# Patient Record
Sex: FEMALE | Race: WHITE | NOT HISPANIC OR LATINO | Employment: FULL TIME | URBAN - METROPOLITAN AREA
[De-identification: names, ages, dates, MRNs, and addresses within clinical notes are randomized per-mention and may not be internally consistent; named-entity substitution may affect disease eponyms.]

---

## 2018-08-22 ENCOUNTER — OFFICE VISIT (OUTPATIENT)
Dept: OBSTETRICS AND GYNECOLOGY | Facility: CLINIC | Age: 48
End: 2018-08-22
Payer: COMMERCIAL

## 2018-08-22 VITALS
WEIGHT: 187.19 LBS | DIASTOLIC BLOOD PRESSURE: 88 MMHG | HEIGHT: 69 IN | BODY MASS INDEX: 27.73 KG/M2 | SYSTOLIC BLOOD PRESSURE: 136 MMHG

## 2018-08-22 DIAGNOSIS — Z00.00 PERIODIC HEALTH ASSESSMENT, GENERAL SCREENING, ADULT: ICD-10-CM

## 2018-08-22 DIAGNOSIS — Z78.0 MENOPAUSE: Primary | ICD-10-CM

## 2018-08-22 PROCEDURE — 99204 OFFICE O/P NEW MOD 45 MIN: CPT | Mod: S$GLB,,, | Performed by: OBSTETRICS & GYNECOLOGY

## 2018-08-22 PROCEDURE — 3008F BODY MASS INDEX DOCD: CPT | Mod: CPTII,S$GLB,, | Performed by: OBSTETRICS & GYNECOLOGY

## 2018-08-22 PROCEDURE — 99999 PR PBB SHADOW E&M-NEW PATIENT-LVL III: CPT | Mod: PBBFAC,,, | Performed by: OBSTETRICS & GYNECOLOGY

## 2018-08-22 RX ORDER — OMEPRAZOLE/SODIUM BICARBONATE 20MG-1.1G
CAPSULE ORAL
Refills: 0 | COMMUNITY
Start: 2018-07-17 | End: 2018-08-22

## 2018-08-22 RX ORDER — LIDOCAINE, MENTHOL 600; 600 MG/1; MG/1
PATCH TOPICAL
Refills: 3 | COMMUNITY
Start: 2018-06-11 | End: 2019-09-12

## 2018-08-22 RX ORDER — TOPIRAMATE 25 MG/1
TABLET ORAL
COMMUNITY
Start: 2018-06-25 | End: 2018-11-05

## 2018-08-22 RX ORDER — DULOXETIN HYDROCHLORIDE 60 MG/1
CAPSULE, DELAYED RELEASE ORAL
COMMUNITY
Start: 2018-08-14 | End: 2018-11-05

## 2018-08-22 RX ORDER — DICLOFENAC SODIUM/MENTHOL 1.5 %-10 %
KIT TOPICAL
Refills: 3 | COMMUNITY
Start: 2018-06-11 | End: 2018-11-05

## 2018-08-22 RX ORDER — LITHIUM CARBONATE 300 MG/1
CAPSULE ORAL
COMMUNITY
Start: 2018-08-14 | End: 2018-08-22

## 2018-08-22 RX ORDER — ESTRADIOL 1 MG/1
1 TABLET ORAL DAILY
Qty: 30 TABLET | Refills: 11 | Status: SHIPPED | OUTPATIENT
Start: 2018-08-22 | End: 2018-11-05

## 2018-08-22 RX ORDER — FENORTHO 200 MG/1
CAPSULE ORAL
Refills: 3 | COMMUNITY
Start: 2018-06-11 | End: 2018-08-22

## 2018-08-22 RX ORDER — DICLOFENAC SODIUM 16.05 MG/ML
SOLUTION TOPICAL
Refills: 0 | COMMUNITY
Start: 2018-07-17 | End: 2018-11-05

## 2018-08-22 RX ORDER — DULOXETIN HYDROCHLORIDE 30 MG/1
CAPSULE, DELAYED RELEASE ORAL
COMMUNITY
Start: 2018-06-25 | End: 2018-11-05

## 2018-08-22 RX ORDER — LIDOCAINE HYDROCHLORIDE 38.8 MG/G
CREAM TOPICAL
Refills: 0 | COMMUNITY
Start: 2018-07-17 | End: 2018-08-22

## 2018-08-22 RX ORDER — TOPIRAMATE 50 MG/1
TABLET, FILM COATED ORAL
COMMUNITY
Start: 2018-08-14 | End: 2019-06-05

## 2018-08-22 RX ORDER — FENOPROFEN CALCIUM 400 MG/1
CAPSULE ORAL
Refills: 0 | COMMUNITY
Start: 2018-07-17 | End: 2018-08-22

## 2018-08-22 RX ORDER — GEL-MATRIX PAD DRESS, SILICONE 2"X5.5"
PAD TOPICAL
Refills: 0 | COMMUNITY
Start: 2018-08-09 | End: 2021-02-11

## 2018-08-22 RX ORDER — DEXTROAMPHETAMINE SACCHARATE, AMPHETAMINE ASPARTATE MONOHYDRATE, DEXTROAMPHETAMINE SULFATE AND AMPHETAMINE SULFATE 5; 5; 5; 5 MG/1; MG/1; MG/1; MG/1
20 CAPSULE, EXTENDED RELEASE ORAL DAILY
COMMUNITY
Start: 2018-08-18 | End: 2021-02-11

## 2018-08-22 RX ORDER — PROGESTERONE 100 MG/1
100 CAPSULE ORAL DAILY
Qty: 30 CAPSULE | Refills: 11 | Status: SHIPPED | OUTPATIENT
Start: 2018-08-22 | End: 2018-11-05

## 2018-08-22 RX ORDER — CAMPHOR, LIDOCAINE, AND METHYL SALICYLATE 2.5; 2; 4 MG/100MG; MG/100MG; MG/100MG
PATCH TOPICAL
Refills: 0 | COMMUNITY
Start: 2018-07-17 | End: 2018-08-22

## 2018-08-22 NOTE — PROGRESS NOTES
Subjective:      Herlinda Ge is a 48 y.o. female who presents to discuss hormone replacement therapy.  Menarche occurred at age 10 and the patient went into menopause at 46 years of age, which was 2 years ago. Patient is requesting hormone replacement therapy due to hot flashes, fatigue, anxiety, depression, weight gain, and urinary incontinence. The patient has never been taking hormone replacement therapy. Patient denies post-menopausal vaginal bleeding. The patient is sexually active.  She denies the following contraindications to HRT:  Vaginal bleeding, history of VTE/PE, thrombophilia,  breast cancer, or active liver disease.       Pap smear: 2018 Normal per pt  Mammogram: 2018 Normal per pt  DEXA: 18    No visits with results within 3 Month(s) from this visit.   Latest known visit with results is:   Office Visit on 2015   Component Date Value Ref Range Status    Blood Stool 2015 Negative  Negative Final     Acceptable 2015 Yes   Final    Clinical Information: 2015 NONE GIVEN   Final    Last Menstrual Period 2015 NONE GIVEN   Final    Previous PAP 2015 NONE GIVEN   Final    Previous Bx 2015 NONE GIVEN   Final    Source: 2015 Cervix   Final    Statement of Adequacy: 2015    Final    Interpretation/Result: 2015    Final    Comment: 2015    Final    Cytotechnologist: 2015    Final       Past Medical History:   Diagnosis Date    ADHD     Fracture, vertebral, lumbar closed     History of anxiety     History of depression     History of eating disorder      Past Surgical History:   Procedure Laterality Date     SECTION      KNEE ARTHROSCOPY      TENDON REPAIR      ankle     TONSILLECTOMY       Social History     Tobacco Use    Smoking status: Never Smoker    Smokeless tobacco: Never Used   Substance Use Topics    Alcohol use: Yes     Alcohol/week: 0.0 oz     Comment: rare    Drug  "use: No     Family History   Problem Relation Age of Onset    Heart attack Father     Breast cancer Mother     Colon cancer Neg Hx     Ovarian cancer Neg Hx     Diabetes Neg Hx     Hypertension Neg Hx     Stroke Neg Hx      OB History    Para Term  AB Living   2 1 1   1 1   SAB TAB Ectopic Multiple Live Births           1      # Outcome Date GA Lbr Florentino/2nd Weight Sex Delivery Anes PTL Lv   2 Term 01 40w0d  4.366 kg (9 lb 10 oz) M CS-Unspec   GELA      Complications: GDM (gestational diabetes mellitus)   1 AB               Obstetric Comments   Age at menarche 10       Current Outpatient Medications:     AMPHETAMINE SALT COMBO 10 MG tablet, , Disp: , Rfl: 0    dextroamphetamine-amphetamine (ADDERALL XR) 20 MG 24 hr capsule, , Disp: , Rfl:     DULoxetine (CYMBALTA) 30 MG capsule, , Disp: , Rfl:     DULoxetine (CYMBALTA) 60 MG capsule, , Disp: , Rfl:     FOLIKA-D 3,775 unit- 1 mg Tab, Take 1 tablet by mouth once daily., Disp: , Rfl: 3    SILIVEX 2 X 5.5 " Pads, APPLY 1 PAD TO SCAR FOR 8-12 HOURS DAILY. REUSE SAME PAD DAILY AND REPLACE PAD EVERY 7 DAYS AS DIRECTED, Disp: , Rfl: 0    TEROCIN, LIDOCAINE-MENTHOL, 4-4 % PtMd, APPLY UP TO 3 PATCHES TO THE AFFECTED AREA TWICE DAILY, Disp: , Rfl: 3    topiramate (TOPAMAX) 25 MG tablet, , Disp: , Rfl:     topiramate (TOPAMAX) 50 MG tablet, , Disp: , Rfl:     diclofenac sodium 1.5 % Drop, APPLY 40 DROPS TO AFFECTED AREA(S) FOUR TIMES DAILY, Disp: , Rfl: 0    DITHOL 1.5-10 % Cmpk, APPLY 40 DROPS TO AFFECTED AREA (S) FOUR TIMES DAILY. APPLY MENTHOL SOLUTION FOR BREAKTHROUGH PAIN, Disp: , Rfl: 3    estradiol (ESTRACE) 1 MG tablet, Take 1 tablet (1 mg total) by mouth once daily. Every morning, Disp: 30 tablet, Rfl: 11    progesterone (PROMETRIUM) 100 MG capsule, Take 1 capsule (100 mg total) by mouth once daily. 30-60 minutes before bed, Disp: 30 capsule, Rfl: 11    Review of Systems:  General: No fever, chills, or weight loss.  Chest: No chest " pain, shortness of breath, or palpitations.  Breast: No pain, masses, or nipple discharge.  Vulva: No pain, lesions, or itching.  Vagina: No relaxation, itching, discharge, or lesions.  Abdomen: No pain, nausea, vomiting, diarrhea, or constipation.  Urinary: No incontinence, nocturia, frequency, or dysuria.  Extremities:  No leg cramps, edema, or calf pain.  Neurologic: No headaches, dizziness, or visual changes.     Assessment:    Menopause  -     DHEA-sulfate; Future; Expected date: 08/22/2018  -     Estradiol; Future; Expected date: 08/22/2018  -     Progesterone; Future; Expected date: 08/22/2018  -     Testosterone; Future; Expected date: 08/22/2018  -     Testosterone, free; Future; Expected date: 08/22/2018  -     Vitamin B12; Future; Expected date: 08/22/2018  -     Vitamin D; Future; Expected date: 08/22/2018  -     estradiol (ESTRACE) 1 MG tablet; Take 1 tablet (1 mg total) by mouth once daily. Every morning  Dispense: 30 tablet; Refill: 11  -     progesterone (PROMETRIUM) 100 MG capsule; Take 1 capsule (100 mg total) by mouth once daily. 30-60 minutes before bed  Dispense: 30 capsule; Refill: 11    Periodic health assessment, general screening, adult  -     CBC auto differential; Future  -     Comprehensive metabolic panel; Future; Expected date: 08/22/2018  -     Hemoglobin A1c; Future; Expected date: 08/22/2018  -     TSH; Future; Expected date: 08/22/2018  -     Lipid panel; Future; Expected date: 08/22/2018  -     T4, free; Future; Expected date: 08/22/2018  -     T3, free; Future; Expected date: 08/22/2018        Plan:   Risks and benefits of hormone replacement therapy were discussed.  Hormone replacement therapy options, including bioidentical versus non-bioidentical hormones, as well as alternatives discussed.  Start:   Estradiol 1 mg orally QPM.   Progesterone 100 mg orally QPM   Testosterone 40 mg/gm cream @ 1/4 gm in vanishing base.  Apply to labia minora QHS.  Wash hands thoroughly after  applying  and avoid skin to skin contact for 4 hours after application to avoid transfer.  FDA black box warning for MI, stroke, and DVT  reviewed.   Osteo Additional Support 3 capsules daily   Velasco Ipriflavone Complex with Ostivone 2 tablets BID  Follow up in 2 months.   Will recheck estradiol, progesterone, and free and total testosterone in 1 month.  Instructed patient to call if she experiences any side effects or has any questions.  45 minutes, >50% counseling.

## 2018-09-12 ENCOUNTER — TELEPHONE (OUTPATIENT)
Dept: OBSTETRICS AND GYNECOLOGY | Facility: CLINIC | Age: 48
End: 2018-09-12

## 2018-09-12 NOTE — TELEPHONE ENCOUNTER
MD Sophie Dodge RN   Caller: Unspecified (Today,  9:31 AM)             Irregular bleeding could last up to 6 months,, but probably not continuously.  If she is feeling dizzy, she needs to call      Pt advised.

## 2018-09-12 NOTE — TELEPHONE ENCOUNTER
Swing pt, recently started hormones. Pt hasn't had period in 18 months but is now bleeding like a regular period.

## 2018-09-12 NOTE — TELEPHONE ENCOUNTER
Pt hasn't had a period in 18 months but started bleeding like a period.  She has been on Estrace and Prometrium for 3 weeks.  She hasn't missed any pills.  Reassured her she may have abnormal bleeding in the first 6 months.      She is asking when it should stop?

## 2018-11-05 ENCOUNTER — CLINICAL SUPPORT (OUTPATIENT)
Dept: OBSTETRICS AND GYNECOLOGY | Facility: CLINIC | Age: 48
End: 2018-11-05
Payer: COMMERCIAL

## 2018-11-05 ENCOUNTER — OFFICE VISIT (OUTPATIENT)
Dept: OBSTETRICS AND GYNECOLOGY | Facility: CLINIC | Age: 48
End: 2018-11-05
Attending: OBSTETRICS & GYNECOLOGY
Payer: COMMERCIAL

## 2018-11-05 VITALS
SYSTOLIC BLOOD PRESSURE: 126 MMHG | HEIGHT: 69 IN | BODY MASS INDEX: 26.76 KG/M2 | DIASTOLIC BLOOD PRESSURE: 82 MMHG | WEIGHT: 180.69 LBS

## 2018-11-05 DIAGNOSIS — Z78.0 MENOPAUSE: Primary | ICD-10-CM

## 2018-11-05 DIAGNOSIS — R45.89 MOODINESS: ICD-10-CM

## 2018-11-05 DIAGNOSIS — N89.8 VAGINAL DISCHARGE: ICD-10-CM

## 2018-11-05 DIAGNOSIS — R53.83 FATIGUE, UNSPECIFIED TYPE: ICD-10-CM

## 2018-11-05 DIAGNOSIS — R63.5 WEIGHT GAIN: ICD-10-CM

## 2018-11-05 LAB
CANDIDA RRNA VAG QL PROBE: POSITIVE
G VAGINALIS RRNA GENITAL QL PROBE: POSITIVE
T VAGINALIS RRNA GENITAL QL PROBE: NEGATIVE

## 2018-11-05 PROCEDURE — 96372 THER/PROPH/DIAG INJ SC/IM: CPT | Mod: S$GLB,,, | Performed by: OBSTETRICS & GYNECOLOGY

## 2018-11-05 PROCEDURE — 99214 OFFICE O/P EST MOD 30 MIN: CPT | Mod: 25,S$GLB,, | Performed by: OBSTETRICS & GYNECOLOGY

## 2018-11-05 PROCEDURE — 99999 PR PBB SHADOW E&M-EST. PATIENT-LVL III: CPT | Mod: PBBFAC,,, | Performed by: OBSTETRICS & GYNECOLOGY

## 2018-11-05 PROCEDURE — 87660 TRICHOMONAS VAGIN DIR PROBE: CPT

## 2018-11-05 PROCEDURE — 99999 PR PBB SHADOW E&M-EST. PATIENT-LVL III: CPT | Mod: PBBFAC,,,

## 2018-11-05 PROCEDURE — 3008F BODY MASS INDEX DOCD: CPT | Mod: CPTII,S$GLB,, | Performed by: OBSTETRICS & GYNECOLOGY

## 2018-11-05 RX ORDER — LIDOCAINE HYDROCHLORIDE 38.8 MG/G
CREAM TOPICAL
Refills: 5 | COMMUNITY
Start: 2018-09-17 | End: 2018-11-05

## 2018-11-05 RX ORDER — LIDOCAINE 2.5% AND PRILOCAINE 2.5% 2.5 %-2.5%
KIT TOPICAL
Refills: 0 | COMMUNITY
Start: 2018-10-30 | End: 2018-11-05

## 2018-11-05 RX ORDER — LIDOCAINE HCL/PALM OIL
SPRAY, NON-AEROSOL (ML) TOPICAL
Refills: 0 | COMMUNITY
Start: 2018-10-30 | End: 2018-11-05

## 2018-11-05 RX ORDER — ESTRADIOL 2 MG/1
2 TABLET ORAL DAILY
Qty: 30 TABLET | Refills: 11 | Status: SHIPPED | OUTPATIENT
Start: 2018-11-05 | End: 2018-11-16 | Stop reason: SDUPTHER

## 2018-11-05 RX ORDER — CAMPHOR, LIDOCAINE, AND METHYL SALICYLATE 2.5; 2; 4 MG/100MG; MG/100MG; MG/100MG
PATCH TOPICAL
Refills: 5 | COMMUNITY
Start: 2018-09-17 | End: 2019-06-05

## 2018-11-05 RX ORDER — PROGESTERONE 200 MG/1
200 CAPSULE ORAL DAILY
Qty: 30 CAPSULE | Refills: 11 | Status: SHIPPED | OUTPATIENT
Start: 2018-11-05 | End: 2018-11-16 | Stop reason: SDUPTHER

## 2018-11-05 RX ORDER — TESTOSTERONE CYPIONATE 200 MG/ML
75 INJECTION, SOLUTION INTRAMUSCULAR
Status: COMPLETED | OUTPATIENT
Start: 2018-11-05 | End: 2019-01-02

## 2018-11-05 RX ADMIN — TESTOSTERONE CYPIONATE 76 MG: 200 INJECTION, SOLUTION INTRAMUSCULAR at 01:11

## 2018-11-05 NOTE — PROGRESS NOTES
Subjective:      Herlinda Ge is a 48 y.o. female who is here for follow-up of hormone replacement therapy.  At her last visit on 18, she had the following concerns:  hot flashes, fatigue, anxiety, depression, and weight gain.  We started her on estradiol 1 mg in the morning, progesterone 100 mg in the evening, testosterone cream 10 mg QHS, Velasco Ipriflavone Complex 2 tablets twice daily, and Osteo Additional Support 3 capsules daily.  The patient states the following symptoms have improved: hot flashes.  Her main concern today is inability to lose weight.  She used to workout regularly, but doesn't now.  She says she walks a lot at work. She also complains of a vaginal odor and discharge.  She had the following side effects: none except maybe a little hair on her chin.  She also had a little spotting when she first started the hormones.  She often falls asleep before applying the testosterone cream.  The patient is sexually active.     No visits with results within 3 Month(s) from this visit.   Latest known visit with results is:   Office Visit on 2015   Component Date Value Ref Range Status    Blood Stool 2015 Negative  Negative Final     Acceptable 2015 Yes   Final    Clinical Information: 2015 NONE GIVEN   Final    Last Menstrual Period 2015 NONE GIVEN   Final    Previous PAP 2015 NONE GIVEN   Final    Previous Bx 2015 NONE GIVEN   Final    Source: 2015 Cervix   Final    Statement of Adequacy: 2015    Final    Interpretation/Result: 2015    Final    Comment: 2015    Final    Cytotechnologist: 2015    Final       Past Medical History:   Diagnosis Date    ADHD     Fracture, vertebral, lumbar closed     History of anxiety     History of depression     History of eating disorder      Past Surgical History:   Procedure Laterality Date     SECTION      KNEE ARTHROSCOPY      TENDON REPAIR    "   ankle     TONSILLECTOMY       Social History     Tobacco Use    Smoking status: Never Smoker    Smokeless tobacco: Never Used   Substance Use Topics    Alcohol use: Yes     Alcohol/week: 0.0 oz     Comment: rare    Drug use: No     Family History   Problem Relation Age of Onset    Heart attack Father     Breast cancer Mother     Colon cancer Neg Hx     Ovarian cancer Neg Hx     Diabetes Neg Hx     Hypertension Neg Hx     Stroke Neg Hx      OB History    Para Term  AB Living   2 1 1   1 1   SAB TAB Ectopic Multiple Live Births           1      # Outcome Date GA Lbr Florentino/2nd Weight Sex Delivery Anes PTL Lv   2 Term 01 40w0d  4.366 kg (9 lb 10 oz) M CS-Unspec   GELA      Complications: GDM (gestational diabetes mellitus)   1 AB               Obstetric Comments   Age at menarche 10       Current Outpatient Medications:     AMPHETAMINE SALT COMBO 10 MG tablet, , Disp: , Rfl: 0    dextroamphetamine-amphetamine (ADDERALL XR) 20 MG 24 hr capsule, , Disp: , Rfl:     FOLIKA-D 3,775 unit- 1 mg Tab, Take 1 tablet by mouth once daily., Disp: , Rfl: 3    SILIVEX 2 X 5.5 " Pads, APPLY 1 PAD TO SCAR FOR 8-12 HOURS DAILY. REUSE SAME PAD DAILY AND REPLACE PAD EVERY 7 DAYS AS DIRECTED, Disp: , Rfl: 0    TEROCIN, LIDOCAINE-MENTHOL, 4-4 % PtMd, APPLY UP TO 3 PATCHES TO THE AFFECTED AREA TWICE DAILY, Disp: , Rfl: 3    topiramate (TOPAMAX) 50 MG tablet, , Disp: , Rfl:     VIVA PATCH 2.5-4-2 % PtMd, APPLY UP TO 3 PATCHES TO THE AFFECTED AREA TWICE DAILY, Disp: , Rfl: 5    estradiol (ESTRACE) 2 MG tablet, Take 1 tablet (2 mg total) by mouth once daily., Disp: 30 tablet, Rfl: 11    progesterone (PROMETRIUM) 200 MG capsule, Take 1 capsule (200 mg total) by mouth once daily., Disp: 30 capsule, Rfl: 11    Current Facility-Administered Medications:     testosterone cypionate injection 76 mg, 76 mg, Intramuscular, Q28 Days, Gisselle Magdaleno MD    Review of Systems:  General: No fever, chills, or weight " loss.  Chest: No chest pain, shortness of breath, or palpitations.  Breast: No pain, masses, or nipple discharge.  Vulva: No pain, lesions, or itching.  Vagina: No relaxation, itching, discharge, or lesions.  Abdomen: No pain, nausea, vomiting, diarrhea, or constipation.  Urinary: No incontinence, nocturia, frequency, or dysuria.  Extremities:  No leg cramps, edema, or calf pain.  Neurologic: No headaches, dizziness, or visual changes.    Physical Exam:  General:  Well developed, well nourished, and in no apparent distress.  HEENT:  Normal sclera.  No thyromegaly  External genitalia:  No lesions, erythema, rashes, or other abnormalities.  Urethra:  No lesions or discharge.  Vagina:  No lesions or tenderness. Minimal white discharge  Cervix:  No lesions, discharge, or cervical motion tenderness.   Uterus:  Normal in size and shape.  Mobile and nontender.   Adnexa:  No masses or tenderness.       Assessment:    Menopause  -     testosterone cypionate injection 76 mg  -     progesterone (PROMETRIUM) 200 MG capsule; Take 1 capsule (200 mg total) by mouth once daily.  Dispense: 30 capsule; Refill: 11  -     estradiol (ESTRACE) 2 MG tablet; Take 1 tablet (2 mg total) by mouth once daily.  Dispense: 30 tablet; Refill: 11    Weight gain    Fatigue, unspecified type    Moodiness    Vaginal discharge  -     Vaginosis Screen by DNA Probe        Plan:   Risks and benefits of hormone replacement therapy were discussed.  Hormone replacement therapy options, including bioidentical versus non-bioidentical hormones, as well as alternatives discussed.  Increase:  Estradiol from 1 to 2 mg orally QPM.  Progesterone 100 to 200 mg orally QPM  Change:  Testosterone cream to Testosterone cypionate 76 mg IM monthly.  Patient is aware this is off-label use in women, and FDA black box warnings were reviewed.  Recommend [place] Diet or Mediterranean Diet.  I recommend 30 minutes 5 days a week of moderate intensity aerobics (fast walking,  treadmill, biking, etc.) and weight-bearing exercise (free weights, machines, tension bands, etc.) at least twice weekly for overall health benefits and the prevention and/or treatment of osteoporosis.  Follow up in 5 months for annual exam.   Will recheck testosterone levels when she is feeling something or if side effects.  Instructed patient to call if she experiences any side effects or has any questions.

## 2018-11-05 NOTE — PROGRESS NOTES
Here for  hormone therapy injection, no complaints at this time , Injection given as ordered, tolerated well, no report of pain prior to or after injection. Return to clinic as scheduled.     Site - [facility]    Testosterone 76 mg    Clinic Supplied Medication

## 2018-11-06 DIAGNOSIS — B37.31 CANDIDIASIS OF VAGINA: ICD-10-CM

## 2018-11-06 DIAGNOSIS — N76.0 BV (BACTERIAL VAGINOSIS): Primary | ICD-10-CM

## 2018-11-06 DIAGNOSIS — B96.89 BV (BACTERIAL VAGINOSIS): Primary | ICD-10-CM

## 2018-11-06 RX ORDER — FLUCONAZOLE 150 MG/1
TABLET ORAL
Qty: 2 TABLET | Refills: 1 | Status: SHIPPED | OUTPATIENT
Start: 2018-11-06 | End: 2019-06-05

## 2018-11-06 RX ORDER — TINIDAZOLE 500 MG/1
2 TABLET ORAL
Qty: 8 TABLET | Refills: 2 | Status: SHIPPED | OUTPATIENT
Start: 2018-11-06 | End: 2018-11-08

## 2018-11-16 ENCOUNTER — TELEPHONE (OUTPATIENT)
Dept: OBSTETRICS AND GYNECOLOGY | Facility: CLINIC | Age: 48
End: 2018-11-16

## 2018-11-16 DIAGNOSIS — Z78.0 MENOPAUSE: ICD-10-CM

## 2018-11-16 RX ORDER — ESTRADIOL 2 MG/1
2 TABLET ORAL DAILY
Qty: 3 TABLET | Refills: 0 | Status: SHIPPED | OUTPATIENT
Start: 2018-11-16 | End: 2018-11-19

## 2018-11-16 RX ORDER — PROGESTERONE 200 MG/1
200 CAPSULE ORAL DAILY
Qty: 3 CAPSULE | Refills: 0 | Status: SHIPPED | OUTPATIENT
Start: 2018-11-16 | End: 2018-11-19

## 2018-11-16 NOTE — TELEPHONE ENCOUNTER
She forgot her hormones at home going to [place] for the weekend.    Pended 3 pills of Estrace and progesterone to pharmacy out of town.      Thank you

## 2018-11-24 DIAGNOSIS — Z78.0 MENOPAUSE: ICD-10-CM

## 2018-11-27 RX ORDER — PROGESTERONE 200 MG/1
CAPSULE ORAL
Qty: 3 CAPSULE | OUTPATIENT
Start: 2018-11-27

## 2018-11-27 RX ORDER — PROGESTERONE 200 MG/1
200 CAPSULE ORAL NIGHTLY
Qty: 30 CAPSULE | Refills: 11 | Status: SHIPPED | OUTPATIENT
Start: 2018-11-27 | End: 2019-06-05

## 2018-11-27 RX ORDER — ESTRADIOL 2 MG/1
2 TABLET ORAL DAILY
Qty: 30 TABLET | Refills: 11 | Status: SHIPPED | OUTPATIENT
Start: 2018-11-27 | End: 2019-06-05

## 2018-11-27 RX ORDER — ESTRADIOL 2 MG/1
TABLET ORAL
Qty: 3 TABLET | OUTPATIENT
Start: 2018-11-27

## 2018-12-03 ENCOUNTER — TELEPHONE (OUTPATIENT)
Dept: OBSTETRICS AND GYNECOLOGY | Facility: CLINIC | Age: 48
End: 2018-12-03

## 2018-12-03 RX ORDER — FLUCONAZOLE 150 MG/1
150 TABLET ORAL ONCE
Qty: 2 TABLET | Refills: 0 | Status: SHIPPED | OUTPATIENT
Start: 2018-12-03 | End: 2018-12-03

## 2018-12-03 NOTE — TELEPHONE ENCOUNTER
Dr Magdaleno pt calling, pt has a yeast infection and would like medication Diflucan sent in. Pt # [phone] [facility] # [phone]

## 2018-12-03 NOTE — TELEPHONE ENCOUNTER
Pt notified rx has been sent to pharmacy.  Advised if no improvement after taking medication she should be seen.

## 2018-12-05 ENCOUNTER — CLINICAL SUPPORT (OUTPATIENT)
Dept: OBSTETRICS AND GYNECOLOGY | Facility: CLINIC | Age: 48
End: 2018-12-05
Payer: COMMERCIAL

## 2018-12-05 PROCEDURE — 96372 THER/PROPH/DIAG INJ SC/IM: CPT | Mod: S$GLB,,, | Performed by: OBSTETRICS & GYNECOLOGY

## 2018-12-05 PROCEDURE — 99999 PR PBB SHADOW E&M-EST. PATIENT-LVL II: CPT | Mod: PBBFAC,,,

## 2018-12-05 RX ADMIN — TESTOSTERONE CYPIONATE 76 MG: 200 INJECTION, SOLUTION INTRAMUSCULAR at 08:12

## 2018-12-05 NOTE — PROGRESS NOTES
Here for  hormone therapy injection, no complaints at this time , Injection given as ordered, tolerated well, no report of pain prior to or after injection. Return to clinic as scheduled.     Site - [facility]    Testosterone  76  mg    Clinic Supplied Medication

## 2019-01-02 ENCOUNTER — CLINICAL SUPPORT (OUTPATIENT)
Dept: OBSTETRICS AND GYNECOLOGY | Facility: CLINIC | Age: 49
End: 2019-01-02
Payer: COMMERCIAL

## 2019-01-02 PROCEDURE — 96372 THER/PROPH/DIAG INJ SC/IM: CPT | Mod: S$GLB,,, | Performed by: OBSTETRICS & GYNECOLOGY

## 2019-01-02 PROCEDURE — 96372 PR INJECTION,THERAP/PROPH/DIAG2ST, IM OR SUBCUT: ICD-10-PCS | Mod: S$GLB,,, | Performed by: OBSTETRICS & GYNECOLOGY

## 2019-01-02 PROCEDURE — 99999 PR PBB SHADOW E&M-EST. PATIENT-LVL II: ICD-10-PCS | Mod: PBBFAC,,,

## 2019-01-02 PROCEDURE — 99999 PR PBB SHADOW E&M-EST. PATIENT-LVL II: CPT | Mod: PBBFAC,,,

## 2019-01-02 RX ADMIN — TESTOSTERONE CYPIONATE 76 MG: 200 INJECTION, SOLUTION INTRAMUSCULAR at 08:01

## 2019-01-30 ENCOUNTER — CLINICAL SUPPORT (OUTPATIENT)
Dept: OBSTETRICS AND GYNECOLOGY | Facility: CLINIC | Age: 49
End: 2019-01-30
Payer: COMMERCIAL

## 2019-01-30 DIAGNOSIS — Z79.890 HORMONE REPLACEMENT THERAPY (HRT): Primary | ICD-10-CM

## 2019-01-30 PROCEDURE — 96372 PR INJECTION,THERAP/PROPH/DIAG2ST, IM OR SUBCUT: ICD-10-PCS | Mod: S$GLB,,, | Performed by: OBSTETRICS & GYNECOLOGY

## 2019-01-30 PROCEDURE — 99999 PR PBB SHADOW E&M-EST. PATIENT-LVL II: CPT | Mod: PBBFAC,,,

## 2019-01-30 PROCEDURE — 99999 PR PBB SHADOW E&M-EST. PATIENT-LVL II: ICD-10-PCS | Mod: PBBFAC,,,

## 2019-01-30 PROCEDURE — 96372 THER/PROPH/DIAG INJ SC/IM: CPT | Mod: S$GLB,,, | Performed by: OBSTETRICS & GYNECOLOGY

## 2019-01-30 RX ORDER — TESTOSTERONE CYPIONATE 200 MG/ML
76 INJECTION, SOLUTION INTRAMUSCULAR
Status: COMPLETED | OUTPATIENT
Start: 2019-01-30 | End: 2019-02-27

## 2019-01-30 RX ADMIN — TESTOSTERONE CYPIONATE 76 MG: 200 INJECTION, SOLUTION INTRAMUSCULAR at 08:01

## 2019-02-27 ENCOUNTER — CLINICAL SUPPORT (OUTPATIENT)
Dept: OBSTETRICS AND GYNECOLOGY | Facility: CLINIC | Age: 49
End: 2019-02-27
Payer: COMMERCIAL

## 2019-02-27 PROCEDURE — 99999 PR PBB SHADOW E&M-EST. PATIENT-LVL II: CPT | Mod: PBBFAC,,,

## 2019-02-27 PROCEDURE — 96372 PR INJECTION,THERAP/PROPH/DIAG2ST, IM OR SUBCUT: ICD-10-PCS | Mod: S$GLB,,, | Performed by: OBSTETRICS & GYNECOLOGY

## 2019-02-27 PROCEDURE — 96372 THER/PROPH/DIAG INJ SC/IM: CPT | Mod: S$GLB,,, | Performed by: OBSTETRICS & GYNECOLOGY

## 2019-02-27 PROCEDURE — 99999 PR PBB SHADOW E&M-EST. PATIENT-LVL II: ICD-10-PCS | Mod: PBBFAC,,,

## 2019-02-27 RX ADMIN — TESTOSTERONE CYPIONATE 76 MG: 200 INJECTION, SOLUTION INTRAMUSCULAR at 08:02

## 2019-03-27 ENCOUNTER — CLINICAL SUPPORT (OUTPATIENT)
Dept: OBSTETRICS AND GYNECOLOGY | Facility: CLINIC | Age: 49
End: 2019-03-27
Payer: COMMERCIAL

## 2019-03-27 DIAGNOSIS — Z79.890 HORMONE REPLACEMENT THERAPY (HRT): Primary | ICD-10-CM

## 2019-03-27 PROCEDURE — 96372 THER/PROPH/DIAG INJ SC/IM: CPT | Mod: S$GLB,,, | Performed by: OBSTETRICS & GYNECOLOGY

## 2019-03-27 PROCEDURE — 99999 PR PBB SHADOW E&M-EST. PATIENT-LVL II: CPT | Mod: PBBFAC,,,

## 2019-03-27 PROCEDURE — 99999 PR PBB SHADOW E&M-EST. PATIENT-LVL II: ICD-10-PCS | Mod: PBBFAC,,,

## 2019-03-27 PROCEDURE — 96372 PR INJECTION,THERAP/PROPH/DIAG2ST, IM OR SUBCUT: ICD-10-PCS | Mod: S$GLB,,, | Performed by: OBSTETRICS & GYNECOLOGY

## 2019-03-27 RX ORDER — TESTOSTERONE CYPIONATE 200 MG/ML
76 INJECTION, SOLUTION INTRAMUSCULAR
Status: SHIPPED | OUTPATIENT
Start: 2019-03-28 | End: 2019-05-23

## 2019-03-27 RX ORDER — TESTOSTERONE CYPIONATE 200 MG/ML
50 INJECTION, SOLUTION INTRAMUSCULAR
Status: DISCONTINUED | OUTPATIENT
Start: 2019-03-27 | End: 2019-03-27

## 2019-03-27 RX ADMIN — TESTOSTERONE CYPIONATE 50 MG: 200 INJECTION, SOLUTION INTRAMUSCULAR at 01:03

## 2019-03-27 RX ADMIN — TESTOSTERONE CYPIONATE 76 MG: 200 INJECTION, SOLUTION INTRAMUSCULAR at 08:03

## 2019-06-05 ENCOUNTER — CLINICAL SUPPORT (OUTPATIENT)
Dept: OBSTETRICS AND GYNECOLOGY | Facility: CLINIC | Age: 49
End: 2019-06-05
Payer: COMMERCIAL

## 2019-06-05 ENCOUNTER — LAB VISIT (OUTPATIENT)
Dept: LAB | Facility: HOSPITAL | Age: 49
End: 2019-06-05
Attending: OBSTETRICS & GYNECOLOGY
Payer: COMMERCIAL

## 2019-06-05 ENCOUNTER — OFFICE VISIT (OUTPATIENT)
Dept: OBSTETRICS AND GYNECOLOGY | Facility: CLINIC | Age: 49
End: 2019-06-05
Payer: COMMERCIAL

## 2019-06-05 VITALS
SYSTOLIC BLOOD PRESSURE: 126 MMHG | WEIGHT: 172.19 LBS | DIASTOLIC BLOOD PRESSURE: 82 MMHG | HEIGHT: 69 IN | BODY MASS INDEX: 25.5 KG/M2

## 2019-06-05 DIAGNOSIS — Z12.31 VISIT FOR SCREENING MAMMOGRAM: ICD-10-CM

## 2019-06-05 DIAGNOSIS — Z12.4 SCREENING FOR CERVICAL CANCER: ICD-10-CM

## 2019-06-05 DIAGNOSIS — Z13.21 ENCOUNTER FOR VITAMIN DEFICIENCY SCREENING: ICD-10-CM

## 2019-06-05 DIAGNOSIS — N89.8 VAGINAL DISCHARGE: ICD-10-CM

## 2019-06-05 DIAGNOSIS — Z78.0 MENOPAUSE: ICD-10-CM

## 2019-06-05 DIAGNOSIS — Z01.419 ENCOUNTER FOR GYNECOLOGICAL EXAMINATION: Primary | ICD-10-CM

## 2019-06-05 DIAGNOSIS — Z11.3 SCREEN FOR SEXUALLY TRANSMITTED DISEASES: ICD-10-CM

## 2019-06-05 DIAGNOSIS — Z00.00 PERIODIC HEALTH ASSESSMENT, GENERAL SCREENING, ADULT: ICD-10-CM

## 2019-06-05 DIAGNOSIS — B37.31 CANDIDIASIS OF VAGINA: ICD-10-CM

## 2019-06-05 DIAGNOSIS — Z11.51 SPECIAL SCREENING EXAMINATION FOR HUMAN PAPILLOMAVIRUS (HPV): ICD-10-CM

## 2019-06-05 DIAGNOSIS — Z78.0 MENOPAUSE: Primary | ICD-10-CM

## 2019-06-05 LAB
25(OH)D3+25(OH)D2 SERPL-MCNC: 90 NG/ML (ref 30–96)
BACTERIA HYPHAE, POC: NEGATIVE
DHEA-S SERPL-MCNC: 132.9 UG/DL (ref 56.2–282.9)
ESTRADIOL SERPL-MCNC: 66 PG/ML
FSH SERPL-ACNC: 48.7 MIU/ML
GARDNERELLA VAGINALIS: NEGATIVE
OTHER MICROSC. OBSERVATIONS: ABNORMAL
POC BACTERIAL VAGINOSIS: NEGATIVE
POC CLUE CELLS: NEGATIVE
PROGEST SERPL-MCNC: 0.1 NG/ML
TESTOST SERPL-MCNC: 28 NG/DL (ref 5–73)
TRICHOMONAS, POC: NEGATIVE
YEAST WET PREP: POSITIVE
YEAST, POC: POSITIVE

## 2019-06-05 PROCEDURE — 84403 ASSAY OF TOTAL TESTOSTERONE: CPT

## 2019-06-05 PROCEDURE — 84144 ASSAY OF PROGESTERONE: CPT

## 2019-06-05 PROCEDURE — 86703 HIV-1/HIV-2 1 RESULT ANTBDY: CPT

## 2019-06-05 PROCEDURE — 82627 DEHYDROEPIANDROSTERONE: CPT

## 2019-06-05 PROCEDURE — 99999 PR PBB SHADOW E&M-EST. PATIENT-LVL I: CPT | Mod: PBBFAC,,,

## 2019-06-05 PROCEDURE — 87340 HEPATITIS B SURFACE AG IA: CPT

## 2019-06-05 PROCEDURE — 88142 CYTOPATH C/V THIN LAYER: CPT

## 2019-06-05 PROCEDURE — 83001 ASSAY OF GONADOTROPIN (FSH): CPT

## 2019-06-05 PROCEDURE — 87220 POCT KOH: ICD-10-PCS | Mod: S$GLB,,, | Performed by: OBSTETRICS & GYNECOLOGY

## 2019-06-05 PROCEDURE — 99999 PR PBB SHADOW E&M-EST. PATIENT-LVL I: ICD-10-PCS | Mod: PBBFAC,,,

## 2019-06-05 PROCEDURE — 86592 SYPHILIS TEST NON-TREP QUAL: CPT

## 2019-06-05 PROCEDURE — 99999 PR PBB SHADOW E&M-EST. PATIENT-LVL III: ICD-10-PCS | Mod: PBBFAC,,, | Performed by: OBSTETRICS & GYNECOLOGY

## 2019-06-05 PROCEDURE — 87624 HPV HI-RISK TYP POOLED RSLT: CPT

## 2019-06-05 PROCEDURE — 82306 VITAMIN D 25 HYDROXY: CPT

## 2019-06-05 PROCEDURE — 87210 SMEAR WET MOUNT SALINE/INK: CPT | Mod: QW,S$GLB,, | Performed by: OBSTETRICS & GYNECOLOGY

## 2019-06-05 PROCEDURE — 96372 PR INJECTION,THERAP/PROPH/DIAG2ST, IM OR SUBCUT: ICD-10-PCS | Mod: S$GLB,,, | Performed by: OBSTETRICS & GYNECOLOGY

## 2019-06-05 PROCEDURE — 99396 PR PREVENTIVE VISIT,EST,40-64: ICD-10-PCS | Mod: 25,S$GLB,, | Performed by: OBSTETRICS & GYNECOLOGY

## 2019-06-05 PROCEDURE — 87220 TISSUE EXAM FOR FUNGI: CPT | Mod: S$GLB,,, | Performed by: OBSTETRICS & GYNECOLOGY

## 2019-06-05 PROCEDURE — 82670 ASSAY OF TOTAL ESTRADIOL: CPT

## 2019-06-05 PROCEDURE — 99396 PREV VISIT EST AGE 40-64: CPT | Mod: 25,S$GLB,, | Performed by: OBSTETRICS & GYNECOLOGY

## 2019-06-05 PROCEDURE — 84402 ASSAY OF FREE TESTOSTERONE: CPT

## 2019-06-05 PROCEDURE — 87210 POCT WET PREP: ICD-10-PCS | Mod: QW,S$GLB,, | Performed by: OBSTETRICS & GYNECOLOGY

## 2019-06-05 PROCEDURE — 96372 THER/PROPH/DIAG INJ SC/IM: CPT | Mod: S$GLB,,, | Performed by: OBSTETRICS & GYNECOLOGY

## 2019-06-05 PROCEDURE — 87491 CHLMYD TRACH DNA AMP PROBE: CPT

## 2019-06-05 PROCEDURE — 99999 PR PBB SHADOW E&M-EST. PATIENT-LVL III: CPT | Mod: PBBFAC,,, | Performed by: OBSTETRICS & GYNECOLOGY

## 2019-06-05 RX ORDER — LIDOCAINE 2.5% AND PRILOCAINE 2.5% 2.5 %-2.5%
KIT TOPICAL
Refills: 3 | COMMUNITY
Start: 2019-05-07 | End: 2019-06-05

## 2019-06-05 RX ORDER — PROGESTERONE 200 MG/1
CAPSULE ORAL
Qty: 90 CAPSULE | Refills: 3 | Status: SHIPPED | OUTPATIENT
Start: 2019-06-05 | End: 2020-06-01

## 2019-06-05 RX ORDER — TESTOSTERONE CYPIONATE 200 MG/ML
100 INJECTION, SOLUTION INTRAMUSCULAR
Status: SHIPPED | OUTPATIENT
Start: 2019-06-05 | End: 2019-11-20

## 2019-06-05 RX ORDER — VILAZODONE HYDROCHLORIDE 40 MG/1
TABLET ORAL
Refills: 2 | COMMUNITY
Start: 2019-03-27 | End: 2021-05-06

## 2019-06-05 RX ORDER — VILAZODONE HYDROCHLORIDE 20 MG/1
TABLET ORAL
Refills: 2 | COMMUNITY
Start: 2019-02-27 | End: 2019-06-05

## 2019-06-05 RX ORDER — DICLOFENAC SODIUM 16.05 MG/ML
SOLUTION TOPICAL
Refills: 3 | COMMUNITY
Start: 2019-05-07 | End: 2019-06-05

## 2019-06-05 RX ORDER — ZALEPLON 10 MG/1
CAPSULE ORAL
Refills: 0 | COMMUNITY
Start: 2019-04-24 | End: 2022-08-30

## 2019-06-05 RX ORDER — ESTRADIOL 2 MG/1
2 TABLET ORAL DAILY
Qty: 90 TABLET | Refills: 3 | Status: SHIPPED | OUTPATIENT
Start: 2019-06-05 | End: 2020-06-01

## 2019-06-05 RX ORDER — FLUCONAZOLE 150 MG/1
TABLET ORAL
Qty: 6 TABLET | Refills: 6 | Status: SHIPPED | OUTPATIENT
Start: 2019-06-05 | End: 2019-09-12

## 2019-06-05 RX ADMIN — TESTOSTERONE CYPIONATE 100 MG: 200 INJECTION, SOLUTION INTRAMUSCULAR at 11:06

## 2019-06-05 NOTE — PROGRESS NOTES
Ordering Provider: Dr. Magdaleno    During visit today patient received an injection of testosterone 100 to RIGHT ventrogluteal.  Tolerated well.  Instructed pt to remain 15 minutes after injection to monitor for reactions.      Pre pain scale: none    Post pain scale: none

## 2019-06-05 NOTE — PROGRESS NOTES
Subjective:       Patient ID: Herlinda Ge is a 49 y.o. female.    Chief Complaint:  Well Woman      History of Present Illness.  Herlinda Ge is a 49 y.o. female.  She has no breast or urinary symptoms.  She has no postcoital bleeding, pelvic pain or vaginal discharge.  She stopped HRT because she didn't feel any different.  Has low energy and moodiness.  Has relationship issues.  She thinks she has a yeats infection due to discharge.    GYN & OB History  No LMP recorded. Patient is postmenopausal.   Pap:  Normal per pt  Mammogram: 2018 Normal per pt  Colonoscopy: No  DEXA:  Normal per pt    OB History    Para Term  AB Living   2 1 1   1 1   SAB TAB Ectopic Multiple Live Births           1      # Outcome Date GA Lbr Florentino/2nd Weight Sex Delivery Anes PTL Lv   2 Term 01 40w0d  4.366 kg (9 lb 10 oz) M CS-Unspec   GELA      Complications: GDM (gestational diabetes mellitus)   1 AB               Obstetric Comments   Age at menarche 10       Past Medical History:   Diagnosis Date    ADHD     Fracture, vertebral, lumbar closed     History of anxiety     History of depression     History of eating disorder      Past Surgical History:   Procedure Laterality Date     SECTION      KNEE ARTHROSCOPY      TENDON REPAIR      ankle     TONSILLECTOMY       Family History   Problem Relation Age of Onset    Heart attack Father     Breast cancer Mother     Colon cancer Neg Hx     Ovarian cancer Neg Hx     Diabetes Neg Hx     Hypertension Neg Hx     Stroke Neg Hx      Social History     Tobacco Use    Smoking status: Never Smoker    Smokeless tobacco: Never Used   Substance Use Topics    Alcohol use: Yes     Alcohol/week: 0.0 oz     Comment: rare    Drug use: No       Current Outpatient Medications:     AMPHETAMINE SALT COMBO 10 MG tablet, , Disp: , Rfl: 0    dextroamphetamine-amphetamine (ADDERALL XR) 20 MG 24 hr capsule, , Disp: , Rfl:     VALERIA 0,455  "unit- 1 mg Tab, Take 1 tablet by mouth once daily., Disp: , Rfl: 3    SILIVEX 2 X 5.5 " Pads, APPLY 1 PAD TO SCAR FOR 8-12 HOURS DAILY. REUSE SAME PAD DAILY AND REPLACE PAD EVERY 7 DAYS AS DIRECTED, Disp: , Rfl: 0    VIIBRYD 40 mg Tab tablet, TK 1 T PO QPM AFTER A MEAL, Disp: , Rfl: 2    zaleplon (SONATA) 10 MG capsule, TK ONE C PO  HS PRN, Disp: , Rfl: 0    estradiol (ESTRACE) 2 MG tablet, Take 1 tablet (2 mg total) by mouth once daily., Disp: 90 tablet, Rfl: 3    fluconazole (DIFLUCAN) 150 MG Tab, And repeat in 3 days if still symptomatic, Disp: 6 tablet, Rfl: 6    progesterone (PROMETRIUM) 200 MG capsule, Take 1 capsule 30-60 minutes before bed every night., Disp: 90 capsule, Rfl: 3    TEROCIN, LIDOCAINE-MENTHOL, 4-4 % PtMd, APPLY UP TO 3 PATCHES TO THE AFFECTED AREA TWICE DAILY, Disp: , Rfl: 3    Current Facility-Administered Medications:     testosterone cypionate injection 100 mg, 100 mg, Intramuscular, Q28 Days, Gisselle Magdaleno MD, 100 mg at 06/05/19 1118    Review of patient's allergies indicates:   Allergen Reactions    Sulfa (sulfonamide antibiotics)        Review of Systems  Review of Systems   Constitutional: Negative for fatigue.   HENT: Negative for trouble swallowing.    Eyes: Negative for visual disturbance.   Respiratory: Negative for cough and shortness of breath.    Cardiovascular: Negative for chest pain.   Gastrointestinal: Negative for abdominal distention, abdominal pain, blood in stool, nausea and vomiting.   Genitourinary: Negative for difficulty urinating, dyspareunia, dysuria, flank pain, frequency, hematuria, pelvic pain, urgency, vaginal bleeding, vaginal discharge and vaginal pain.   Musculoskeletal: Negative for arthralgias.   Skin: Negative for rash.   Neurological: Negative for dizziness and headaches.   Psychiatric/Behavioral: Negative for sleep disturbance. The patient is not nervous/anxious.         Objective:     Vitals:    06/05/19 1035   BP: 126/82   Weight: 78.1 kg " "(172 lb 2.9 oz)   Height: 5' 9" (1.753 m)   PainSc: 0-No pain     Body mass index is 25.43 kg/m².    Physical Exam:   Constitutional: She is oriented to person, place, and time. Vital signs are normal. She appears well-developed and well-nourished.    HENT:   Head: Normocephalic.     Neck: Normal range of motion. No thyromegaly present.     Pulmonary/Chest: Right breast exhibits no mass, no nipple discharge, no skin change, no tenderness and no swelling. Left breast exhibits no mass, no nipple discharge, no skin change, no tenderness and no swelling. Breasts are symmetrical.        Abdominal: Soft. Normal appearance and bowel sounds are normal. She exhibits no distension. There is no tenderness.     Genitourinary: Vagina normal and uterus normal. Pelvic exam was performed with patient supine. There is no rash, tenderness, lesion or injury on the right labia. There is no rash, tenderness, lesion or injury on the left labia. Cervix is normal. Right adnexum displays no mass, no tenderness and no fullness. Left adnexum displays no mass, no tenderness and no fullness. No erythema in the vagina. No vaginal discharge found. Cervix exhibits no motion tenderness and no discharge.           Musculoskeletal: Normal range of motion.      Lymphadenopathy:        Right: No inguinal and no supraclavicular adenopathy present.        Left: No inguinal and no supraclavicular adenopathy present.    Neurological: She is alert and oriented to person, place, and time.    Skin: Skin is warm and dry.    Psychiatric: She has a normal mood and affect.     KOH:  +hyphae  Wet Prep:  negative     Assessment/ Plan:     Encounter for gynecological examination    Special screening examination for human papillomavirus (HPV)  -     Liquid-based pap smear, screening    Screening for cervical cancer  -     HPV High Risk Genotypes, PCR    Visit for screening mammogram  -     Mammo Digital Screening Bilat w/ Karl; Future; Expected date: " 06/05/2019    Screen for sexually transmitted diseases  -     RPR; Future; Expected date: 06/05/2019  -     HIV 1/2 Ag/Ab (4th Gen); Future; Expected date: 06/05/2019  -     Hepatitis B surface antigen; Future; Expected date: 06/05/2019  -     C. trachomatis/N. gonorrhoeae by AMP DNA    Menopause  -     DHEA-sulfate; Future; Expected date: 06/05/2019  -     Estradiol; Future; Expected date: 06/05/2019  -     Progesterone; Future; Expected date: 06/05/2019  -     Testosterone; Future; Expected date: 06/05/2019  -     Testosterone, free; Future; Expected date: 06/05/2019  -     Follicle stimulating hormone; Future; Expected date: 06/05/2019  -     progesterone (PROMETRIUM) 200 MG capsule; Take 1 capsule 30-60 minutes before bed every night.  Dispense: 90 capsule; Refill: 3  -     estradiol (ESTRACE) 2 MG tablet; Take 1 tablet (2 mg total) by mouth once daily.  Dispense: 90 tablet; Refill: 3  -     testosterone cypionate injection 100 mg    Encounter for vitamin deficiency screening  -     Vitamin D; Future; Expected date: 06/05/2019  -     POCT WET PREP  -     POCT KOH    Candidiasis of vagina  -     fluconazole (DIFLUCAN) 150 MG Tab; And repeat in 3 days if still symptomatic  Dispense: 6 tablet; Refill: 6    Vaginal discharge    Periodic health assessment, general screening, adult  -     CBC auto differential; Future  -     Comprehensive metabolic panel; Future; Expected date: 06/05/2019  -     Hemoglobin A1c; Future; Expected date: 06/05/2019  -     Lipid panel; Future; Expected date: 06/05/2019  -     TSH; Future; Expected date: 06/05/2019      Restart BHRT.  Baseline labs today and T 100 after.  Routine pap smears.  Self breast exam and mammography discussed  Routine colonoscopy discussed.  Diet and exercise discussed.  Recommend calcium 1200 mg and vitamin D 1000 units daily and routine bone mineral density testing.  Yearly influenza vaccination discussed.  Follow-up with me in 3 months for HRT F/U.

## 2019-06-06 ENCOUNTER — LAB VISIT (OUTPATIENT)
Dept: LAB | Facility: HOSPITAL | Age: 49
End: 2019-06-06
Attending: OBSTETRICS & GYNECOLOGY
Payer: COMMERCIAL

## 2019-06-06 DIAGNOSIS — Z00.00 PERIODIC HEALTH ASSESSMENT, GENERAL SCREENING, ADULT: ICD-10-CM

## 2019-06-06 LAB
ALBUMIN SERPL BCP-MCNC: 3.6 G/DL (ref 3.5–5.2)
ALP SERPL-CCNC: 65 U/L (ref 55–135)
ALT SERPL W/O P-5'-P-CCNC: 17 U/L (ref 10–44)
ANION GAP SERPL CALC-SCNC: 7 MMOL/L (ref 8–16)
AST SERPL-CCNC: 35 U/L (ref 10–40)
BASOPHILS # BLD AUTO: 0.06 K/UL (ref 0–0.2)
BASOPHILS NFR BLD: 1 % (ref 0–1.9)
BILIRUB SERPL-MCNC: 0.6 MG/DL (ref 0.1–1)
BUN SERPL-MCNC: 23 MG/DL (ref 6–20)
CALCIUM SERPL-MCNC: 9.2 MG/DL (ref 8.7–10.5)
CHLORIDE SERPL-SCNC: 105 MMOL/L (ref 95–110)
CHOLEST SERPL-MCNC: 196 MG/DL (ref 120–199)
CHOLEST/HDLC SERPL: 3.1 {RATIO} (ref 2–5)
CO2 SERPL-SCNC: 26 MMOL/L (ref 23–29)
CREAT SERPL-MCNC: 0.8 MG/DL (ref 0.5–1.4)
DIFFERENTIAL METHOD: NORMAL
EOSINOPHIL # BLD AUTO: 0.3 K/UL (ref 0–0.5)
EOSINOPHIL NFR BLD: 5.6 % (ref 0–8)
ERYTHROCYTE [DISTWIDTH] IN BLOOD BY AUTOMATED COUNT: 12.5 % (ref 11.5–14.5)
EST. GFR  (AFRICAN AMERICAN): >60 ML/MIN/1.73 M^2
EST. GFR  (NON AFRICAN AMERICAN): >60 ML/MIN/1.73 M^2
ESTIMATED AVG GLUCOSE: 97 MG/DL (ref 68–131)
GLUCOSE SERPL-MCNC: 85 MG/DL (ref 70–110)
HBA1C MFR BLD HPLC: 5 % (ref 4–5.6)
HBV SURFACE AG SERPL QL IA: NEGATIVE
HCT VFR BLD AUTO: 43.6 % (ref 37–48.5)
HDLC SERPL-MCNC: 64 MG/DL (ref 40–75)
HDLC SERPL: 32.7 % (ref 20–50)
HGB BLD-MCNC: 14.6 G/DL (ref 12–16)
HIV 1+2 AB+HIV1 P24 AG SERPL QL IA: NEGATIVE
IMM GRANULOCYTES # BLD AUTO: 0.01 K/UL (ref 0–0.04)
IMM GRANULOCYTES NFR BLD AUTO: 0.2 % (ref 0–0.5)
LDLC SERPL CALC-MCNC: 119.6 MG/DL (ref 63–159)
LYMPHOCYTES # BLD AUTO: 1.5 K/UL (ref 1–4.8)
LYMPHOCYTES NFR BLD: 25.3 % (ref 18–48)
MCH RBC QN AUTO: 29.7 PG (ref 27–31)
MCHC RBC AUTO-ENTMCNC: 33.5 G/DL (ref 32–36)
MCV RBC AUTO: 89 FL (ref 82–98)
MONOCYTES # BLD AUTO: 0.5 K/UL (ref 0.3–1)
MONOCYTES NFR BLD: 8.9 % (ref 4–15)
NEUTROPHILS # BLD AUTO: 3.4 K/UL (ref 1.8–7.7)
NEUTROPHILS NFR BLD: 59 % (ref 38–73)
NONHDLC SERPL-MCNC: 132 MG/DL
NRBC BLD-RTO: 0 /100 WBC
PLATELET # BLD AUTO: 294 K/UL (ref 150–350)
PMV BLD AUTO: 10.6 FL (ref 9.2–12.9)
POTASSIUM SERPL-SCNC: 4 MMOL/L (ref 3.5–5.1)
PROT SERPL-MCNC: 6.3 G/DL (ref 6–8.4)
RBC # BLD AUTO: 4.91 M/UL (ref 4–5.4)
RPR SER QL: NORMAL
SODIUM SERPL-SCNC: 138 MMOL/L (ref 136–145)
TRIGL SERPL-MCNC: 62 MG/DL (ref 30–150)
TSH SERPL DL<=0.005 MIU/L-ACNC: 1.49 UIU/ML (ref 0.4–4)
WBC # BLD AUTO: 5.76 K/UL (ref 3.9–12.7)

## 2019-06-06 PROCEDURE — 83036 HEMOGLOBIN GLYCOSYLATED A1C: CPT

## 2019-06-06 PROCEDURE — 80053 COMPREHEN METABOLIC PANEL: CPT

## 2019-06-06 PROCEDURE — 80061 LIPID PANEL: CPT

## 2019-06-06 PROCEDURE — 84443 ASSAY THYROID STIM HORMONE: CPT

## 2019-06-06 PROCEDURE — 85025 COMPLETE CBC W/AUTO DIFF WBC: CPT

## 2019-06-07 LAB
C TRACH DNA SPEC QL NAA+PROBE: NOT DETECTED
HPV HR 12 DNA CVX QL NAA+PROBE: NEGATIVE
HPV16 AG SPEC QL: NEGATIVE
HPV18 DNA SPEC QL NAA+PROBE: NEGATIVE
N GONORRHOEA DNA SPEC QL NAA+PROBE: NOT DETECTED

## 2019-06-11 LAB — TESTOST FREE SERPL-MCNC: 1.2 PG/ML

## 2019-07-03 ENCOUNTER — CLINICAL SUPPORT (OUTPATIENT)
Dept: OBSTETRICS AND GYNECOLOGY | Facility: CLINIC | Age: 49
End: 2019-07-03
Payer: COMMERCIAL

## 2019-07-03 DIAGNOSIS — Z78.0 MENOPAUSE: Primary | ICD-10-CM

## 2019-07-03 PROCEDURE — 96372 THER/PROPH/DIAG INJ SC/IM: CPT | Mod: S$GLB,,, | Performed by: OBSTETRICS & GYNECOLOGY

## 2019-07-03 PROCEDURE — 96372 PR INJECTION,THERAP/PROPH/DIAG2ST, IM OR SUBCUT: ICD-10-PCS | Mod: S$GLB,,, | Performed by: OBSTETRICS & GYNECOLOGY

## 2019-07-03 RX ADMIN — TESTOSTERONE CYPIONATE 100 MG: 200 INJECTION, SOLUTION INTRAMUSCULAR at 11:07

## 2019-07-03 NOTE — PROGRESS NOTES
Ordering Provider: Dr. Magdaleno    Patient here to receive T100 injection to Left deltoid. Tolerated well, no reaction noted. Instructed to wait 15 minutes after administration for monitoring    Pre pain scale: none  Post pain scale: none

## 2019-09-12 ENCOUNTER — OFFICE VISIT (OUTPATIENT)
Dept: OBSTETRICS AND GYNECOLOGY | Facility: CLINIC | Age: 49
End: 2019-09-12
Payer: COMMERCIAL

## 2019-09-12 ENCOUNTER — APPOINTMENT (OUTPATIENT)
Dept: RADIOLOGY | Facility: OTHER | Age: 49
End: 2019-09-12
Attending: OBSTETRICS & GYNECOLOGY
Payer: COMMERCIAL

## 2019-09-12 VITALS — WEIGHT: 166.25 LBS | HEIGHT: 69 IN | BODY MASS INDEX: 24.62 KG/M2

## 2019-09-12 DIAGNOSIS — R68.82 DECREASED LIBIDO: ICD-10-CM

## 2019-09-12 DIAGNOSIS — Z78.0 MENOPAUSE: Primary | ICD-10-CM

## 2019-09-12 DIAGNOSIS — Z12.31 VISIT FOR SCREENING MAMMOGRAM: ICD-10-CM

## 2019-09-12 DIAGNOSIS — Z91.89 INCREASED RISK OF BREAST CANCER: Primary | ICD-10-CM

## 2019-09-12 PROCEDURE — 99999 PR PBB SHADOW E&M-EST. PATIENT-LVL III: CPT | Mod: PBBFAC,,, | Performed by: OBSTETRICS & GYNECOLOGY

## 2019-09-12 PROCEDURE — 77063 BREAST TOMOSYNTHESIS BI: CPT | Mod: 26,,, | Performed by: RADIOLOGY

## 2019-09-12 PROCEDURE — 99213 PR OFFICE/OUTPT VISIT, EST, LEVL III, 20-29 MIN: ICD-10-PCS | Mod: S$GLB,,, | Performed by: OBSTETRICS & GYNECOLOGY

## 2019-09-12 PROCEDURE — 99999 PR PBB SHADOW E&M-EST. PATIENT-LVL III: ICD-10-PCS | Mod: PBBFAC,,, | Performed by: OBSTETRICS & GYNECOLOGY

## 2019-09-12 PROCEDURE — 3008F PR BODY MASS INDEX (BMI) DOCUMENTED: ICD-10-PCS | Mod: CPTII,S$GLB,, | Performed by: OBSTETRICS & GYNECOLOGY

## 2019-09-12 PROCEDURE — 77067 SCR MAMMO BI INCL CAD: CPT | Mod: 26,,, | Performed by: RADIOLOGY

## 2019-09-12 PROCEDURE — 3008F BODY MASS INDEX DOCD: CPT | Mod: CPTII,S$GLB,, | Performed by: OBSTETRICS & GYNECOLOGY

## 2019-09-12 PROCEDURE — 77063 MAMMO DIGITAL SCREENING BILAT WITH TOMOSYNTHESIS_CAD: ICD-10-PCS | Mod: 26,,, | Performed by: RADIOLOGY

## 2019-09-12 PROCEDURE — 77067 MAMMO DIGITAL SCREENING BILAT WITH TOMOSYNTHESIS_CAD: ICD-10-PCS | Mod: 26,,, | Performed by: RADIOLOGY

## 2019-09-12 PROCEDURE — 77067 SCR MAMMO BI INCL CAD: CPT | Mod: TC,PN

## 2019-09-12 PROCEDURE — 99213 OFFICE O/P EST LOW 20 MIN: CPT | Mod: S$GLB,,, | Performed by: OBSTETRICS & GYNECOLOGY

## 2019-09-12 RX ORDER — LIDOCAINE 2.5% AND PRILOCAINE 2.5% 2.5 %-2.5%
KIT TOPICAL
Refills: 3 | COMMUNITY
Start: 2019-06-05 | End: 2021-02-11

## 2019-09-12 RX ORDER — GABAPENTIN 100 MG/1
CAPSULE ORAL
Refills: 2 | COMMUNITY
Start: 2019-08-16 | End: 2019-09-12

## 2019-09-12 RX ORDER — METHYL SALICYLATE, CAPSAICIN, MENTHOL 25; .025; 1 G/100ML; G/100ML; G/100ML
LOTION TOPICAL
Refills: 3 | COMMUNITY
Start: 2019-08-02 | End: 2020-10-02

## 2019-09-12 RX ORDER — DICLOFENAC SODIUM 16.05 MG/ML
SOLUTION TOPICAL
Refills: 3 | COMMUNITY
Start: 2019-09-04 | End: 2020-10-02

## 2019-09-12 RX ORDER — LIDOCAINE 2.5% AND PRILOCAINE 2.5% 2.5 %-2.5%
KIT TOPICAL
Refills: 3 | COMMUNITY
Start: 2019-08-02 | End: 2021-11-08

## 2019-09-12 RX ORDER — CLOBETASOL PROPIONATE 0.5 MG/G
CREAM TOPICAL
Refills: 0 | COMMUNITY
Start: 2019-08-13 | End: 2020-06-17

## 2019-09-12 NOTE — PROGRESS NOTES
Subjective:      Herlinda Ge is a 49 y.o. female who is here for follow-up of hormone replacement therapy.  At her annual, she had stopped all HRT and we discussed restarting. She is taking estradiol 2 mg,  progesterone 200 mg, and testosterone 100 mg monthly.  Sex drive is low but her last injection was 7/3/19.  She is very stressed about money right now.    No visits with results within 3 Month(s) from this visit.   Latest known visit with results is:   Lab Visit on 06/06/2019   Component Date Value Ref Range Status    WBC 06/06/2019 5.76  3.90 - 12.70 K/uL Final    RBC 06/06/2019 4.91  4.00 - 5.40 M/uL Final    Hemoglobin 06/06/2019 14.6  12.0 - 16.0 g/dL Final    Hematocrit 06/06/2019 43.6  37.0 - 48.5 % Final    Mean Corpuscular Volume 06/06/2019 89  82 - 98 fL Final    Mean Corpuscular Hemoglobin 06/06/2019 29.7  27.0 - 31.0 pg Final    Mean Corpuscular Hemoglobin Conc 06/06/2019 33.5  32.0 - 36.0 g/dL Final    RDW 06/06/2019 12.5  11.5 - 14.5 % Final    Platelets 06/06/2019 294  150 - 350 K/uL Final    MPV 06/06/2019 10.6  9.2 - 12.9 fL Final    Immature Granulocytes 06/06/2019 0.2  0.0 - 0.5 % Final    Gran # (ANC) 06/06/2019 3.4  1.8 - 7.7 K/uL Final    Immature Grans (Abs) 06/06/2019 0.01  0.00 - 0.04 K/uL Final    Lymph # 06/06/2019 1.5  1.0 - 4.8 K/uL Final    Mono # 06/06/2019 0.5  0.3 - 1.0 K/uL Final    Eos # 06/06/2019 0.3  0.0 - 0.5 K/uL Final    Baso # 06/06/2019 0.06  0.00 - 0.20 K/uL Final    nRBC 06/06/2019 0  0 /100 WBC Final    Gran% 06/06/2019 59.0  38.0 - 73.0 % Final    Lymph% 06/06/2019 25.3  18.0 - 48.0 % Final    Mono% 06/06/2019 8.9  4.0 - 15.0 % Final    Eosinophil% 06/06/2019 5.6  0.0 - 8.0 % Final    Basophil% 06/06/2019 1.0  0.0 - 1.9 % Final    Differential Method 06/06/2019 Automated   Final    Sodium 06/06/2019 138  136 - 145 mmol/L Final    Potassium 06/06/2019 4.0  3.5 - 5.1 mmol/L Final    Chloride 06/06/2019 105  95 - 110 mmol/L Final     CO2 2019 26  23 - 29 mmol/L Final    Glucose 2019 85  70 - 110 mg/dL Final    BUN, Bld 2019 23* 6 - 20 mg/dL Final    Creatinine 2019 0.8  0.5 - 1.4 mg/dL Final    Calcium 2019 9.2  8.7 - 10.5 mg/dL Final    Total Protein 2019 6.3  6.0 - 8.4 g/dL Final    Albumin 2019 3.6  3.5 - 5.2 g/dL Final    Total Bilirubin 2019 0.6  0.1 - 1.0 mg/dL Final    Alkaline Phosphatase 2019 65  55 - 135 U/L Final    AST 2019 35  10 - 40 U/L Final    ALT 2019 17  10 - 44 U/L Final    Anion Gap 2019 7* 8 - 16 mmol/L Final    eGFR if African American 2019 >60.0  >60 mL/min/1.73 m^2 Final    eGFR if non African American 2019 >60.0  >60 mL/min/1.73 m^2 Final    Hemoglobin A1C 2019 5.0  4.0 - 5.6 % Final    Estimated Avg Glucose 2019 97  68 - 131 mg/dL Final    Cholesterol 2019 196  120 - 199 mg/dL Final    Triglycerides 2019 62  30 - 150 mg/dL Final    HDL 2019 64  40 - 75 mg/dL Final    LDL Cholesterol 2019 119.6  63.0 - 159.0 mg/dL Final    Hdl/Cholesterol Ratio 2019 32.7  20.0 - 50.0 % Final    Total Cholesterol/HDL Ratio 2019 3.1  2.0 - 5.0 Final    Non-HDL Cholesterol 2019 132  mg/dL Final    TSH 2019 1.487  0.400 - 4.000 uIU/mL Final       Past Medical History:   Diagnosis Date    ADHD     Fracture, vertebral, lumbar closed     History of anxiety     History of depression     History of eating disorder      Past Surgical History:   Procedure Laterality Date     SECTION      KNEE ARTHROSCOPY      TENDON REPAIR      ankle     TONSILLECTOMY       Social History     Tobacco Use    Smoking status: Never Smoker    Smokeless tobacco: Never Used   Substance Use Topics    Alcohol use: Yes     Alcohol/week: 0.0 oz     Comment: rare    Drug use: No     Family History   Problem Relation Age of Onset    Heart attack Father     Breast cancer Mother     Colon  "cancer Neg Hx     Ovarian cancer Neg Hx     Diabetes Neg Hx     Hypertension Neg Hx     Stroke Neg Hx      OB History    Para Term  AB Living   2 1 1   1 1   SAB TAB Ectopic Multiple Live Births           1      # Outcome Date GA Lbr Florentino/2nd Weight Sex Delivery Anes PTL Lv   2 Term 01 40w0d  4.366 kg (9 lb 10 oz) M CS-Unspec   GELA      Complications: GDM (gestational diabetes mellitus)   1 AB               Obstetric Comments   Age at menarche 10       Current Outpatient Medications:     AMPHETAMINE SALT COMBO 10 MG tablet, , Disp: , Rfl: 0    clobetasol (TEMOVATE) 0.05 % cream, KOFI AA ON HANDS BID, Disp: , Rfl: 0    dextroamphetamine-amphetamine (ADDERALL XR) 20 MG 24 hr capsule, , Disp: , Rfl:     IBUPROFEN ORAL, Take by mouth., Disp: , Rfl:     progesterone (PROMETRIUM) 200 MG capsule, Take 1 capsule 30-60 minutes before bed every night., Disp: 90 capsule, Rfl: 3    SILIVEX 2 X 5.5 " Pads, APPLY 1 PAD TO SCAR FOR 8-12 HOURS DAILY. REUSE SAME PAD DAILY AND REPLACE PAD EVERY 7 DAYS AS DIRECTED, Disp: , Rfl: 0    VIIBRYD 40 mg Tab tablet, TK 1 T PO QPM AFTER A MEAL, Disp: , Rfl: 2    zaleplon (SONATA) 10 MG capsule, TK ONE C PO  HS PRN, Disp: , Rfl: 0    diclofenac sodium 1.5 % Drop, APPLY 40 DROPS TO AFFECTED AREA(S) FOUR TIMES DAILY, Disp: , Rfl: 3    estradiol (ESTRACE) 2 MG tablet, Take 1 tablet (2 mg total) by mouth once daily., Disp: 90 tablet, Rfl: 3    LIMENCIN,CAPSAICIN-M-SAL-MENT, 0.025-25-10 % Lotn, APPLY TO THE AFFECTED AREA(S) 3 TO 4 TIMES DAILY, Disp: , Rfl: 3    PRILOVIX LITE cream, APPLY 2.5 GRAMS TO AFFECTED AREA AND COVER. REMOVE COVERING AND WIPE AWAY EXCESS CREAM AFTER 2 HOURS. MAY REPEAT AFTER 4 HOURS IF NEEDED, Disp: , Rfl: 3    PRILOVIX LITE PLUS cream, APPLY 2.5 GRAMS TO AFFECTED AREA(S) AND COVER. REMOVE COVERING AND WIPE AWAY EXCESS CREAM AFTER 2 HOURS. MAY REPEAT AFTER 4 HOURS IF NEEDED, Disp: , Rfl: 3    Current Facility-Administered Medications:     " "testosterone cypionate injection 100 mg, 100 mg, Intramuscular, Q28 Days, Gisselle Magdaleno MD, 100 mg at 07/03/19 1108    Review of Systems:  General: No fever, chills, or weight loss.  Chest: No chest pain, shortness of breath, or palpitations.  Breast: No pain, masses, or nipple discharge.  Vulva: No pain, lesions, or itching.  Vagina: No relaxation, itching, discharge, or lesions.  Abdomen: No pain, nausea, vomiting, diarrhea, or constipation.  Urinary: No incontinence, nocturia, frequency, or dysuria.  Extremities:  No leg cramps, edema, or calf pain.  Neurologic: No headaches, dizziness, or visual changes.    Vitals:    09/12/19 0916   Weight: 75.4 kg (166 lb 3.6 oz)   Height: 5' 9" (1.753 m)   PainSc: 0-No pain     Body mass index is 24.55 kg/m².       Assessment:    There are no diagnoses linked to this encounter.    Plan:   Risks and benefits of hormone replacement therapy were discussed.  Hormone replacement therapy options, including bioidentical versus non-bioidentical hormones, as well as alternatives discussed.    Estradiol 2 mg orally QAM.  Progesterone 200 mg orally QPM  Change to testosterone cream due to cost.  Will start with 2%.  Apply one pump to the labia minora (which are the lips on the vulva without hair around the vaginal opening) every evening before bed.  Do not wear underwear to avoid removal.  Avoid any direct skin to skin contact in that area for at least 4 hours after application to avoid transfer to another person.  Also, please wash hands thoroughly after application to avoid transfer to pets or children.  Patient is aware this is off-label use in women, and FDA black box warnings were reviewed.  Follow up in 2 months.  Instructed patient to call if she experiences any side effects or has any questions.  I spent 15 minutes with s patient today, >50% counseling.  "

## 2019-09-18 ENCOUNTER — TELEPHONE (OUTPATIENT)
Dept: OBSTETRICS AND GYNECOLOGY | Facility: CLINIC | Age: 49
End: 2019-09-18

## 2019-09-18 NOTE — TELEPHONE ENCOUNTER
Dr. Magdaleno pt, said she hasn't received her testosterone cream yet and was told someone was supposed to call her back with information first and then she could get her RX cream. Please call pt and advise, thank you.

## 2019-09-18 NOTE — TELEPHONE ENCOUNTER
Spoke with "[employer] and they are reaching out to her today. Called pt and informed her to be aware of their call. Pt verbalized understanding.

## 2019-11-07 ENCOUNTER — OFFICE VISIT (OUTPATIENT)
Dept: OBSTETRICS AND GYNECOLOGY | Facility: CLINIC | Age: 49
End: 2019-11-07
Payer: COMMERCIAL

## 2019-11-07 VITALS
BODY MASS INDEX: 23.84 KG/M2 | WEIGHT: 160.94 LBS | HEIGHT: 69 IN | SYSTOLIC BLOOD PRESSURE: 122 MMHG | DIASTOLIC BLOOD PRESSURE: 80 MMHG

## 2019-11-07 DIAGNOSIS — Z78.0 MENOPAUSE: ICD-10-CM

## 2019-11-07 DIAGNOSIS — N93.0 POSTCOITAL BLEEDING: Primary | ICD-10-CM

## 2019-11-07 PROCEDURE — 99214 PR OFFICE/OUTPT VISIT, EST, LEVL IV, 30-39 MIN: ICD-10-PCS | Mod: S$GLB,,, | Performed by: OBSTETRICS & GYNECOLOGY

## 2019-11-07 PROCEDURE — 99214 OFFICE O/P EST MOD 30 MIN: CPT | Mod: S$GLB,,, | Performed by: OBSTETRICS & GYNECOLOGY

## 2019-11-07 PROCEDURE — 3008F PR BODY MASS INDEX (BMI) DOCUMENTED: ICD-10-PCS | Mod: CPTII,S$GLB,, | Performed by: OBSTETRICS & GYNECOLOGY

## 2019-11-07 PROCEDURE — 99999 PR PBB SHADOW E&M-EST. PATIENT-LVL III: ICD-10-PCS | Mod: PBBFAC,,, | Performed by: OBSTETRICS & GYNECOLOGY

## 2019-11-07 PROCEDURE — 99999 PR PBB SHADOW E&M-EST. PATIENT-LVL III: CPT | Mod: PBBFAC,,, | Performed by: OBSTETRICS & GYNECOLOGY

## 2019-11-07 PROCEDURE — 3008F BODY MASS INDEX DOCD: CPT | Mod: CPTII,S$GLB,, | Performed by: OBSTETRICS & GYNECOLOGY

## 2019-11-07 RX ORDER — GABAPENTIN 100 MG/1
CAPSULE ORAL
Refills: 2 | COMMUNITY
Start: 2019-09-18 | End: 2021-02-11

## 2019-11-07 NOTE — LETTER
November 7, 2019      [facility]  [address] [place] [address]  Phone: [phone]  Fax: [phone]       Patient: Herlinda Ge   YOB: 1970  Date of Visit: 11/07/2019    To Whom It May Concern:    Dhruv Ge  was at [facility] on 11/07/2019. She may return today, Thursday 11-7-19.  If you have any questions or concerns, or if I can be of further assistance, please do not hesitate to contact me.    Sincerely,      Gisselle Magdaleno M.D.

## 2019-11-07 NOTE — PROGRESS NOTES
Subjective:      Herlinda Ge is a 49 y.o. female who is here for follow-up of hormone replacement therapy.  At her last visit on 9/12/19, she said her sex drive was low but her last injection was 7/3/19.  She was very stressed about money right now.    PLAN on 9/12/19:  Estradiol 2 mg orally QAM.  Progesterone 200 mg orally QPM  Change to testosterone cream due to cost.  Will start with 2%.    She had bleeding 1 month ago after sex.  She was having spotting about every 4-5 months prior to this after sex.  Her estradiol came back at 66 in June with an FSH of 48.  We thought she might be perimenopausal, so I told her to call me if she has breast tenderness but she did not.  She was not sexually active then but is now.  She is doing all of the above.      No visits with results within 3 Month(s) from this visit.   Latest known visit with results is:   Lab Visit on 06/06/2019   Component Date Value Ref Range Status    WBC 06/06/2019 5.76  3.90 - 12.70 K/uL Final    RBC 06/06/2019 4.91  4.00 - 5.40 M/uL Final    Hemoglobin 06/06/2019 14.6  12.0 - 16.0 g/dL Final    Hematocrit 06/06/2019 43.6  37.0 - 48.5 % Final    Mean Corpuscular Volume 06/06/2019 89  82 - 98 fL Final    Mean Corpuscular Hemoglobin 06/06/2019 29.7  27.0 - 31.0 pg Final    Mean Corpuscular Hemoglobin Conc 06/06/2019 33.5  32.0 - 36.0 g/dL Final    RDW 06/06/2019 12.5  11.5 - 14.5 % Final    Platelets 06/06/2019 294  150 - 350 K/uL Final    MPV 06/06/2019 10.6  9.2 - 12.9 fL Final    Immature Granulocytes 06/06/2019 0.2  0.0 - 0.5 % Final    Gran # (ANC) 06/06/2019 3.4  1.8 - 7.7 K/uL Final    Immature Grans (Abs) 06/06/2019 0.01  0.00 - 0.04 K/uL Final    Lymph # 06/06/2019 1.5  1.0 - 4.8 K/uL Final    Mono # 06/06/2019 0.5  0.3 - 1.0 K/uL Final    Eos # 06/06/2019 0.3  0.0 - 0.5 K/uL Final    Baso # 06/06/2019 0.06  0.00 - 0.20 K/uL Final    nRBC 06/06/2019 0  0 /100 WBC Final    Gran% 06/06/2019 59.0  38.0 - 73.0 % Final     Lymph% 06/06/2019 25.3  18.0 - 48.0 % Final    Mono% 06/06/2019 8.9  4.0 - 15.0 % Final    Eosinophil% 06/06/2019 5.6  0.0 - 8.0 % Final    Basophil% 06/06/2019 1.0  0.0 - 1.9 % Final    Differential Method 06/06/2019 Automated   Final    Sodium 06/06/2019 138  136 - 145 mmol/L Final    Potassium 06/06/2019 4.0  3.5 - 5.1 mmol/L Final    Chloride 06/06/2019 105  95 - 110 mmol/L Final    CO2 06/06/2019 26  23 - 29 mmol/L Final    Glucose 06/06/2019 85  70 - 110 mg/dL Final    BUN, Bld 06/06/2019 23* 6 - 20 mg/dL Final    Creatinine 06/06/2019 0.8  0.5 - 1.4 mg/dL Final    Calcium 06/06/2019 9.2  8.7 - 10.5 mg/dL Final    Total Protein 06/06/2019 6.3  6.0 - 8.4 g/dL Final    Albumin 06/06/2019 3.6  3.5 - 5.2 g/dL Final    Total Bilirubin 06/06/2019 0.6  0.1 - 1.0 mg/dL Final    Alkaline Phosphatase 06/06/2019 65  55 - 135 U/L Final    AST 06/06/2019 35  10 - 40 U/L Final    ALT 06/06/2019 17  10 - 44 U/L Final    Anion Gap 06/06/2019 7* 8 - 16 mmol/L Final    eGFR if African American 06/06/2019 >60.0  >60 mL/min/1.73 m^2 Final    eGFR if non African American 06/06/2019 >60.0  >60 mL/min/1.73 m^2 Final    Hemoglobin A1C 06/06/2019 5.0  4.0 - 5.6 % Final    Estimated Avg Glucose 06/06/2019 97  68 - 131 mg/dL Final    Cholesterol 06/06/2019 196  120 - 199 mg/dL Final    Triglycerides 06/06/2019 62  30 - 150 mg/dL Final    HDL 06/06/2019 64  40 - 75 mg/dL Final    LDL Cholesterol 06/06/2019 119.6  63.0 - 159.0 mg/dL Final    Hdl/Cholesterol Ratio 06/06/2019 32.7  20.0 - 50.0 % Final    Total Cholesterol/HDL Ratio 06/06/2019 3.1  2.0 - 5.0 Final    Non-HDL Cholesterol 06/06/2019 132  mg/dL Final    TSH 06/06/2019 1.487  0.400 - 4.000 uIU/mL Final       Past Medical History:   Diagnosis Date    ADHD     Fracture, vertebral, lumbar closed     History of anxiety     History of depression     History of eating disorder     Hormone replacement therapy (HRT)     Post-menopause 2016      Past Surgical History:   Procedure Laterality Date     SECTION      Boy    KNEE ARTHROSCOPY Left     TENDON REPAIR Left     ankle     TONSILLECTOMY       Social History     Tobacco Use    Smoking status: Never Smoker    Smokeless tobacco: Never Used   Substance Use Topics    Alcohol use: Yes     Alcohol/week: 0.0 standard drinks     Comment: rare    Drug use: No     Family History   Problem Relation Age of Onset    Heart attack Father     Breast cancer Mother 71    Colon cancer Neg Hx     Ovarian cancer Neg Hx     Diabetes Neg Hx     Hypertension Neg Hx     Stroke Neg Hx      OB History    Para Term  AB Living   2 1 1   1 1   SAB TAB Ectopic Multiple Live Births           1      # Outcome Date GA Lbr Florentino/2nd Weight Sex Delivery Anes PTL Lv   2 Term 01 40w0d  4.366 kg (9 lb 10 oz) M CS-LTranv   GELA      Complications: GDM (gestational diabetes mellitus)   1 AB               Obstetric Comments   menarche 10       Current Outpatient Medications:     AMPHETAMINE SALT COMBO 10 MG tablet, , Disp: , Rfl: 0    clobetasol (TEMOVATE) 0.05 % cream, KOFI AA ON HANDS BID, Disp: , Rfl: 0    dextroamphetamine-amphetamine (ADDERALL XR) 20 MG 24 hr capsule, , Disp: , Rfl:     diclofenac sodium 1.5 % Drop, APPLY 40 DROPS TO AFFECTED AREA(S) FOUR TIMES DAILY, Disp: , Rfl: 3    estradiol (ESTRACE) 2 MG tablet, Take 1 tablet (2 mg total) by mouth once daily., Disp: 90 tablet, Rfl: 3    IBUPROFEN ORAL, Take by mouth., Disp: , Rfl:     LIMENCIN,CAPSAICIN-M-SAL-MENT, 0.025-25-10 % Lotn, APPLY TO THE AFFECTED AREA(S) 3 TO 4 TIMES DAILY, Disp: , Rfl: 3    PRILOVIX LITE cream, APPLY 2.5 GRAMS TO AFFECTED AREA AND COVER. REMOVE COVERING AND WIPE AWAY EXCESS CREAM AFTER 2 HOURS. MAY REPEAT AFTER 4 HOURS IF NEEDED, Disp: , Rfl: 3    PRILOVIX LITE PLUS cream, APPLY 2.5 GRAMS TO AFFECTED AREA(S) AND COVER. REMOVE COVERING AND WIPE AWAY EXCESS CREAM AFTER 2 HOURS. MAY REPEAT AFTER 4  "HOURS IF NEEDED, Disp: , Rfl: 3    progesterone (PROMETRIUM) 200 MG capsule, Take 1 capsule 30-60 minutes before bed every night., Disp: 90 capsule, Rfl: 3    SILIVEX 2 X 5.5 " Pads, APPLY 1 PAD TO SCAR FOR 8-12 HOURS DAILY. REUSE SAME PAD DAILY AND REPLACE PAD EVERY 7 DAYS AS DIRECTED, Disp: , Rfl: 0    VIIBRYD 40 mg Tab tablet, TK 1 T PO QPM AFTER A MEAL, Disp: , Rfl: 2    zaleplon (SONATA) 10 MG capsule, TK ONE C PO  HS PRN, Disp: , Rfl: 0    gabapentin (NEURONTIN) 100 MG capsule, TK 1 C PO TID, Disp: , Rfl: 2    Current Facility-Administered Medications:     testosterone cypionate injection 100 mg, 100 mg, Intramuscular, Q28 Days, Gisselle Magdaleno MD, 100 mg at 07/03/19 1108    Review of Systems:  General: No fever, chills, or weight loss.  Chest: No chest pain, shortness of breath, or palpitations.  Breast: No pain, masses, or nipple discharge.  Vulva: No pain, lesions, or itching.  Vagina: No relaxation, itching, discharge, or lesions.  Abdomen: No pain, nausea, vomiting, diarrhea, or constipation.  Urinary: No incontinence, nocturia, frequency, or dysuria.  Extremities:  No leg cramps, edema, or calf pain.  Neurologic: No headaches, dizziness, or visual changes.    Vitals:    11/07/19 0923   BP: 122/80   Weight: 73 kg (160 lb 15 oz)   Height: 5' 9" (1.753 m)   PainSc: 0-No pain     Body mass index is 23.77 kg/m².       Assessment:    Postcoital bleeding  -     US Pelvis Comp with Transvag NON-OB (xpd; Future; Expected date: 11/07/2019    Menopause  -     Estradiol; Future; Expected date: 11/07/2019  -     Follicle stimulating hormone; Future; Expected date: 11/07/2019  -     Progesterone; Future; Expected date: 11/07/2019        Plan:   Risks and benefits of hormone replacement therapy were discussed.  Hormone replacement therapy options, including bioidentical versus non-bioidentical hormones, as well as alternatives discussed.    Picture unclear if totally through menopause and now sexually active " with no contraception.  Stop HRT for 4 weeks and check FSH, estradiol, and progesterone  Use condoms    Follow up in 6 weeks.  Instructed patient to call if she experiences any side effects or has any questions.  I spent 25 minutes with [facility] patient today, >50% counseling.

## 2019-12-04 ENCOUNTER — LAB VISIT (OUTPATIENT)
Dept: LAB | Facility: HOSPITAL | Age: 49
End: 2019-12-04
Attending: OBSTETRICS & GYNECOLOGY
Payer: COMMERCIAL

## 2019-12-04 DIAGNOSIS — Z78.0 MENOPAUSE: ICD-10-CM

## 2019-12-04 LAB
ESTRADIOL SERPL-MCNC: 10 PG/ML
FSH SERPL-ACNC: 99.3 MIU/ML
PROGEST SERPL-MCNC: 0.2 NG/ML

## 2019-12-04 PROCEDURE — 83001 ASSAY OF GONADOTROPIN (FSH): CPT

## 2019-12-04 PROCEDURE — 84144 ASSAY OF PROGESTERONE: CPT

## 2019-12-04 PROCEDURE — 82670 ASSAY OF TOTAL ESTRADIOL: CPT

## 2019-12-05 ENCOUNTER — PATIENT MESSAGE (OUTPATIENT)
Dept: OBSTETRICS AND GYNECOLOGY | Facility: CLINIC | Age: 49
End: 2019-12-05

## 2020-01-02 ENCOUNTER — OFFICE VISIT (OUTPATIENT)
Dept: OBSTETRICS AND GYNECOLOGY | Facility: CLINIC | Age: 50
End: 2020-01-02
Payer: COMMERCIAL

## 2020-01-02 VITALS
DIASTOLIC BLOOD PRESSURE: 74 MMHG | SYSTOLIC BLOOD PRESSURE: 116 MMHG | BODY MASS INDEX: 24.2 KG/M2 | HEIGHT: 69 IN | WEIGHT: 163.38 LBS

## 2020-01-02 DIAGNOSIS — Z78.0 MENOPAUSE: Primary | ICD-10-CM

## 2020-01-02 PROBLEM — R68.82 DECREASED LIBIDO: Status: RESOLVED | Noted: 2019-09-12 | Resolved: 2020-01-02

## 2020-01-02 PROBLEM — R63.5 WEIGHT GAIN: Status: RESOLVED | Noted: 2018-11-05 | Resolved: 2020-01-02

## 2020-01-02 PROBLEM — R45.89 MOODINESS: Status: RESOLVED | Noted: 2018-11-05 | Resolved: 2020-01-02

## 2020-01-02 PROCEDURE — 3008F BODY MASS INDEX DOCD: CPT | Mod: CPTII,S$GLB,, | Performed by: OBSTETRICS & GYNECOLOGY

## 2020-01-02 PROCEDURE — 3008F PR BODY MASS INDEX (BMI) DOCUMENTED: ICD-10-PCS | Mod: CPTII,S$GLB,, | Performed by: OBSTETRICS & GYNECOLOGY

## 2020-01-02 PROCEDURE — 99213 PR OFFICE/OUTPT VISIT, EST, LEVL III, 20-29 MIN: ICD-10-PCS | Mod: S$GLB,,, | Performed by: OBSTETRICS & GYNECOLOGY

## 2020-01-02 PROCEDURE — 99999 PR PBB SHADOW E&M-EST. PATIENT-LVL III: CPT | Mod: PBBFAC,,, | Performed by: OBSTETRICS & GYNECOLOGY

## 2020-01-02 PROCEDURE — 99999 PR PBB SHADOW E&M-EST. PATIENT-LVL III: ICD-10-PCS | Mod: PBBFAC,,, | Performed by: OBSTETRICS & GYNECOLOGY

## 2020-01-02 PROCEDURE — 99213 OFFICE O/P EST LOW 20 MIN: CPT | Mod: S$GLB,,, | Performed by: OBSTETRICS & GYNECOLOGY

## 2020-02-05 ENCOUNTER — PATIENT MESSAGE (OUTPATIENT)
Dept: OBSTETRICS AND GYNECOLOGY | Facility: CLINIC | Age: 50
End: 2020-02-05

## 2020-02-05 DIAGNOSIS — Z78.0 MENOPAUSE: Primary | ICD-10-CM

## 2020-03-26 ENCOUNTER — TELEPHONE (OUTPATIENT)
Dept: OBSTETRICS AND GYNECOLOGY | Facility: CLINIC | Age: 50
End: 2020-03-26

## 2020-03-26 NOTE — TELEPHONE ENCOUNTER
"Pt thinks she has Chlamydia.  C/o discharge with odor.  She recently got back with her ex who "isn't good about that".  Recommended an appt.  Scheduled tomorrow with Viri.   "

## 2020-03-27 ENCOUNTER — OFFICE VISIT (OUTPATIENT)
Dept: OBSTETRICS AND GYNECOLOGY | Facility: CLINIC | Age: 50
End: 2020-03-27
Payer: COMMERCIAL

## 2020-03-27 VITALS
BODY MASS INDEX: 23.43 KG/M2 | DIASTOLIC BLOOD PRESSURE: 76 MMHG | HEIGHT: 69 IN | WEIGHT: 158.19 LBS | SYSTOLIC BLOOD PRESSURE: 122 MMHG

## 2020-03-27 DIAGNOSIS — B96.89 BV (BACTERIAL VAGINOSIS): ICD-10-CM

## 2020-03-27 DIAGNOSIS — Z11.3 SCREENING EXAMINATION FOR STD (SEXUALLY TRANSMITTED DISEASE): ICD-10-CM

## 2020-03-27 DIAGNOSIS — N76.0 BV (BACTERIAL VAGINOSIS): ICD-10-CM

## 2020-03-27 DIAGNOSIS — N89.8 VAGINAL ODOR: ICD-10-CM

## 2020-03-27 DIAGNOSIS — N89.8 VAGINAL DISCHARGE: Primary | ICD-10-CM

## 2020-03-27 LAB
BACTERIA HYPHAE, POC: POSITIVE
GARDNERELLA VAGINALIS: NEGATIVE
OTHER MICROSC. OBSERVATIONS: ABNORMAL
POC BACTERIAL VAGINOSIS: POSITIVE
POC CLUE CELLS: NEGATIVE
TRICHOMONAS, POC: NEGATIVE
YEAST WET PREP: NEGATIVE
YEAST, POC: NEGATIVE

## 2020-03-27 PROCEDURE — 99999 PR PBB SHADOW E&M-EST. PATIENT-LVL III: ICD-10-PCS | Mod: PBBFAC,,, | Performed by: NURSE PRACTITIONER

## 2020-03-27 PROCEDURE — 87210 SMEAR WET MOUNT SALINE/INK: CPT | Mod: QW,S$GLB,, | Performed by: NURSE PRACTITIONER

## 2020-03-27 PROCEDURE — 3008F PR BODY MASS INDEX (BMI) DOCUMENTED: ICD-10-PCS | Mod: CPTII,S$GLB,, | Performed by: NURSE PRACTITIONER

## 2020-03-27 PROCEDURE — 3008F BODY MASS INDEX DOCD: CPT | Mod: CPTII,S$GLB,, | Performed by: NURSE PRACTITIONER

## 2020-03-27 PROCEDURE — 99214 OFFICE O/P EST MOD 30 MIN: CPT | Mod: 25,S$GLB,, | Performed by: NURSE PRACTITIONER

## 2020-03-27 PROCEDURE — 87210 POCT WET PREP: ICD-10-PCS | Mod: QW,S$GLB,, | Performed by: NURSE PRACTITIONER

## 2020-03-27 PROCEDURE — 87491 CHLMYD TRACH DNA AMP PROBE: CPT

## 2020-03-27 PROCEDURE — 99999 PR PBB SHADOW E&M-EST. PATIENT-LVL III: CPT | Mod: PBBFAC,,, | Performed by: NURSE PRACTITIONER

## 2020-03-27 PROCEDURE — 99214 PR OFFICE/OUTPT VISIT, EST, LEVL IV, 30-39 MIN: ICD-10-PCS | Mod: 25,S$GLB,, | Performed by: NURSE PRACTITIONER

## 2020-03-27 RX ORDER — METRONIDAZOLE 500 MG/1
500 TABLET ORAL 2 TIMES DAILY
Qty: 14 TABLET | Refills: 0 | Status: SHIPPED | OUTPATIENT
Start: 2020-03-27 | End: 2020-04-03

## 2020-03-27 RX ORDER — MELOXICAM 7.5 MG/1
7.5 TABLET ORAL
COMMUNITY
Start: 2020-03-09 | End: 2020-10-02

## 2020-03-27 NOTE — PROGRESS NOTES
Chief Complaint:    Chief Complaint   Patient presents with    Vaginitis     renee; vaginal discharge and  odor     STD CHECK        (Dr. Magdaleno patient)    Last Pap:   6/10/2019      HPI:     Herlinda Ge is a 50 y.o. female  presents with complaint of white vaginal discharge with odor x past few months.   She was sexually active briefly a few months ago with an ex-boyfriend that she does not trust, and is worried she either has BV or and STD like Chlamydia, which she had about a year ago and was treated.    She would like STD screening today for GC/CT/Trich.    LMP:  No LMP recorded. Patient is postmenopausal.  She is currently using no method for contraception.    Past Medical History:   Diagnosis Date    ADHD     Fracture, vertebral, lumbar closed     History of anxiety     History of depression     History of eating disorder     Hormone replacement therapy (HRT)     Post-menopause        Past Surgical History:   Procedure Laterality Date     SECTION      Boy    KNEE ARTHROSCOPY Left     TENDON REPAIR Left     ankle     TONSILLECTOMY         OB History    Para Term  AB Living   2 1 1   1 1   SAB TAB Ectopic Multiple Live Births           1      # Outcome Date GA Lbr Florentino/2nd Weight Sex Delivery Anes PTL Lv   2 Term 01 40w0d  4.366 kg (9 lb 10 oz) M CS-LTranv   GELA      Complications: GDM (gestational diabetes mellitus)   1 AB 1987              Obstetric Comments   menarche 10       ROS:     GENERAL:  Denies fever, chills, fatigability or weight loss/gain.  REPRODUCTIVE:  See HPI.  ABDOMEN:  Denies abdominal pain, nausea, vomiting, diarrhea, constipation, or change in appetite.  URINARY:  Denies incontinence, nocturia, frequency, dysuria, or hematuria.  CARDIOVASCULAR:  No chest pain. No shortness of breath. No leg cramps.  NEUROLOGICAL:  No headaches. No vision changes.    Physical Exam:     Vitals:    20 1138   BP: 122/76   Weight: 71.7 kg (158  "lb 2.9 oz)   Height: 5' 9" (1.753 m)   PainSc: 0-No pain     Body mass index is 23.36 kg/m².    GENERAL: No acute distress, alert and engaged.  ABDOMEN:  (--) suprapubic tenderness.  MUSCULOSKELETAL:  No CVA tenderness.  VULVA: normal appearing vulva with no masses, tenderness or lesions.   VAGINA: normal appearing vagina with normal color, and no lesions or ulcers; small amount thin white malodorous discharge - KOH/WetPrep collected.   CERVIX: normal appearing cervix without discharge or lesions; no CMT.   UTERUS: uterus is normal size, shape, consistency and non-tender; mobile.   ADNEXA: normal adnexa in size, non-tender and no masses palpated.    Results:     KOH/Wet Prep results:  BV:   POS,  Candida:  neg,  Trichomonas:   neg       (See full results under LABS in Epic)    Assessment/Plan:   Vaginal discharge    Vaginal odor  -     POCT KOH  -     POCT Wet Prep    Screening examination for STD (sexually transmitted disease)  -     C. trachomatis/N. gonorrhoeae by AMP DNA [facility]; Cervicovaginal    BV (bacterial vaginosis)  -     metroNIDAZOLE (FLAGYL) 500 MG tablet; Take 1 tablet (500 mg total) by mouth 2 (two) times daily. for 7 days  Dispense: 14 tablet; Refill: 0      Counseling:     * Use of the [facility] [employer] Patient Portal discussed and encouraged during today's visit.   * Patient aware she will be notified of any results from today's visit once they have been reviewed by Provider, either via her [facility] patient portal, or telephone call.    Follow-up:  Follow up if symptoms worsen or fail to improve.               "

## 2020-03-29 LAB
C TRACH DNA SPEC QL NAA+PROBE: NOT DETECTED
N GONORRHOEA DNA SPEC QL NAA+PROBE: NOT DETECTED

## 2020-05-30 DIAGNOSIS — Z78.0 MENOPAUSE: ICD-10-CM

## 2020-06-01 RX ORDER — ESTRADIOL 2 MG/1
TABLET ORAL
Qty: 90 TABLET | Refills: 1 | Status: SHIPPED | OUTPATIENT
Start: 2020-06-01 | End: 2020-06-17 | Stop reason: SDUPTHER

## 2020-06-01 RX ORDER — PROGESTERONE 200 MG/1
CAPSULE ORAL
Qty: 90 CAPSULE | Refills: 1 | Status: SHIPPED | OUTPATIENT
Start: 2020-06-01 | End: 2020-06-17 | Stop reason: SDUPTHER

## 2020-06-16 NOTE — PROGRESS NOTES
Subjective:       Patient ID: Herlinda Ge is a 50 y.o. female.    Chief Complaint:  Well Woman (Annual Exam, Last pap/hpv 19 negative)      History of Present Illness.  Herlinda Ge is a 50 y.o. female.  She has no breast or urinary symptoms.  She has no postcoital bleeding, pelvic pain or vaginal discharge.  She complains of postcoital bleeding x 2 months since being with a new partner.  They don't use condoms.    Current HRT Regimen:  Estradiol 2 mg   Progesterone 200 mg  Testosterone cream    GYN & OB History  No LMP recorded. Patient is postmenopausal.   Pap: 6/10/2019 Normal HPV negative  Mammogram: 19 Normal, TC 27.35%  Colonoscopy: No  DEXA:    PCP:  None  Routine Labs:  2019 with me    OB History    Para Term  AB Living   2 1 1   1 1   SAB TAB Ectopic Multiple Live Births           1      # Outcome Date GA Lbr Florentino/2nd Weight Sex Delivery Anes PTL Lv   2 Term 01 40w0d  4.366 kg (9 lb 10 oz) M CS-LTranv   GELA      Complications: GDM (gestational diabetes mellitus)   1 AB 1987              Obstetric Comments   menarche 10       Past Medical History:   Diagnosis Date    ADHD     Fracture, vertebral, lumbar closed     History of anxiety     History of depression     History of eating disorder     Hormone replacement therapy (HRT)     Post-menopause      Past Surgical History:   Procedure Laterality Date     SECTION      Boy    KNEE ARTHROSCOPY Left     TENDON REPAIR Left     ankle     TONSILLECTOMY       Family History   Problem Relation Age of Onset    Heart attack Father     Breast cancer Mother 71    Colon cancer Neg Hx     Ovarian cancer Neg Hx     Diabetes Neg Hx     Hypertension Neg Hx     Stroke Neg Hx      Social History     Tobacco Use    Smoking status: Never Smoker    Smokeless tobacco: Never Used   Substance Use Topics    Alcohol use: Yes     Alcohol/week: 0.0 standard drinks     Comment: rare    Drug use: No  "      Current Outpatient Medications:     AMPHETAMINE SALT COMBO 10 MG tablet, , Disp: , Rfl: 0    CMPD testosterone proprionate 2% in vanicream, Apply topically. Apply 1 gram as directed 2-3 times weekly., Disp: , Rfl:     dextroamphetamine-amphetamine (ADDERALL XR) 20 MG 24 hr capsule, , Disp: , Rfl:     PRILOVIX LITE PLUS cream, APPLY 2.5 GRAMS TO AFFECTED AREA(S) AND COVER. REMOVE COVERING AND WIPE AWAY EXCESS CREAM AFTER 2 HOURS. MAY REPEAT AFTER 4 HOURS IF NEEDED, Disp: , Rfl: 3    SILIVEX 2 X 5.5 " Pads, APPLY 1 PAD TO SCAR FOR 8-12 HOURS DAILY. REUSE SAME PAD DAILY AND REPLACE PAD EVERY 7 DAYS AS DIRECTED, Disp: , Rfl: 0    VIIBRYD 40 mg Tab tablet, TK 1 T PO QPM AFTER A MEAL, Disp: , Rfl: 2    zaleplon (SONATA) 10 MG capsule, TK ONE C PO  HS PRN, Disp: , Rfl: 0    diclofenac sodium 1.5 % Drop, APPLY 40 DROPS TO AFFECTED AREA(S) FOUR TIMES DAILY, Disp: , Rfl: 3    estradioL (ESTRACE) 2 MG tablet, Take 1 tablet (2 mg total) by mouth once daily., Disp: 90 tablet, Rfl: 3    gabapentin (NEURONTIN) 100 MG capsule, TK 1 C PO TID, Disp: , Rfl: 2    LIMENCIN,CAPSAICIN-M-SAL-MENT, 0.025-25-10 % Lotn, APPLY TO THE AFFECTED AREA(S) 3 TO 4 TIMES DAILY, Disp: , Rfl: 3    meloxicam (MOBIC) 7.5 MG tablet, , Disp: , Rfl:     PRILOVIX LITE cream, APPLY 2.5 GRAMS TO AFFECTED AREA AND COVER. REMOVE COVERING AND WIPE AWAY EXCESS CREAM AFTER 2 HOURS. MAY REPEAT AFTER 4 HOURS IF NEEDED, Disp: , Rfl: 3    progesterone (PROMETRIUM) 200 MG capsule, Take 1 capsule by mouth 30-60 minutes before bed every night, Disp: 90 capsule, Rfl: 3    Review of patient's allergies indicates:   Allergen Reactions    Sulfa (sulfonamide antibiotics) Itching and Swelling       Review of Systems  Review of Systems   Constitutional: Negative for fatigue.   HENT: Negative for trouble swallowing.    Eyes: Negative for visual disturbance.   Respiratory: Negative for cough and shortness of breath.    Cardiovascular: Negative for chest pain. " "  Gastrointestinal: Negative for abdominal distention, abdominal pain, blood in stool, nausea and vomiting.   Genitourinary: Negative for difficulty urinating, dyspareunia, dysuria, flank pain, frequency, hematuria, pelvic pain, urgency, vaginal bleeding, vaginal discharge and vaginal pain.   Musculoskeletal: Negative for arthralgias.   Skin: Negative for rash.   Neurological: Negative for dizziness and headaches.   Psychiatric/Behavioral: Negative for sleep disturbance. The patient is not nervous/anxious.         Objective:     Vitals:    06/17/20 1345   BP: 126/78   Temp: 98.2 °F (36.8 °C)   Weight: 71 kg (156 lb 8.4 oz)   Height: 5' 9" (1.753 m)   PainSc: 0-No pain     Body mass index is 23.11 kg/m².    Physical Exam:   Constitutional: She is oriented to person, place, and time. She appears well-developed and well-nourished.    HENT:   Head: Normocephalic.     Neck: Normal range of motion. No thyromegaly present.     Pulmonary/Chest: Right breast exhibits no mass, no nipple discharge, no skin change, no tenderness and no swelling. Left breast exhibits no mass, no nipple discharge, no skin change, no tenderness and no swelling. Breasts are symmetrical.        Abdominal: Soft. Normal appearance and bowel sounds are normal. She exhibits no distension. There is no abdominal tenderness.     Genitourinary:    Vagina and uterus normal.      Pelvic exam was performed with patient supine.   There is no rash, tenderness, lesion or injury on the right labia. There is no rash, tenderness, lesion or injury on the left labia. Cervix is normal. Right adnexum displays no mass, no tenderness and no fullness. Left adnexum displays no mass, no tenderness and no fullness. No erythema in the vagina. Cervix exhibits no motion tenderness and no discharge.           Musculoskeletal: Normal range of motion.      Lymphadenopathy:        Right: No supraclavicular adenopathy present.        Left: No supraclavicular adenopathy present.  "   Neurological: She is alert and oriented to person, place, and time.    Skin: Skin is warm and dry.    Psychiatric: She has a normal mood and affect.        Assessment/ Plan:     Increased risk of breast cancer  -     Mammo Digital Screening Bilat w/ Karl; Future; Expected date: 06/17/2020    Family history of breast cancer in mother  -     Mammo Digital Screening Bilat w/ Karl; Future; Expected date: 06/17/2020    Encounter for gynecological examination    Menopause  -     Discontinue: estradioL (ESTRACE) 2 MG tablet; Take 1 tablet (2 mg total) by mouth once daily.  Dispense: 90 tablet; Refill: 3  -     Discontinue: progesterone (PROMETRIUM) 200 MG capsule; TAKE 1 CAPSULE BY MOUTH EVERY NIGHT 30 TO 60 MINUTES BEFORE BEDTIME  Dispense: 90 capsule; Refill: 3  -     DXA Bone Density Spine And Hip; Future; Expected date: 06/17/2020  -     estradioL (ESTRACE) 2 MG tablet; Take 1 tablet (2 mg total) by mouth once daily.  Dispense: 90 tablet; Refill: 3  -     progesterone (PROMETRIUM) 200 MG capsule; Take 1 capsule by mouth 30-60 minutes before bed every night  Dispense: 90 capsule; Refill: 3  -     Testosterone, free; Future; Expected date: 06/17/2020  -     Testosterone; Future; Expected date: 06/17/2020    Screen for colon cancer  -     Ambulatory referral/consult to Gastroenterology; Future; Expected date: 06/24/2020    Establishing care with new doctor, encounter for  -     Ambulatory referral/consult to Internal Medicine; Future; Expected date: 06/24/2020    Postcoital bleeding  -     US Pelvis Comp with Transvag NON-OB (xpd; Future; Expected date: 06/17/2020  -     C. trachomatis/N. gonorrhoeae by AMP DNA [facility]; Cervix    Screen for STD (sexually transmitted disease)  -     HIV-1 and HIV-2 antibodies; Future; Expected date: 06/17/2020  -     RPR; Future; Expected date: 06/17/2020  -     Hepatitis B surface antigen; Future; Expected date: 06/17/2020        Routine pap smears.  Self breast exam and mammography  discussed  Routine colonoscopy discussed.  Diet and exercise discussed.  Recommend calcium 1200 mg and vitamin D 600 units daily and routine bone mineral density testing.  Yearly influenza vaccination discussed.  Follow-up with me in 1 year

## 2020-06-17 ENCOUNTER — OFFICE VISIT (OUTPATIENT)
Dept: OBSTETRICS AND GYNECOLOGY | Facility: CLINIC | Age: 50
End: 2020-06-17
Payer: COMMERCIAL

## 2020-06-17 ENCOUNTER — LAB VISIT (OUTPATIENT)
Dept: LAB | Facility: HOSPITAL | Age: 50
End: 2020-06-17
Attending: OBSTETRICS & GYNECOLOGY
Payer: COMMERCIAL

## 2020-06-17 VITALS
SYSTOLIC BLOOD PRESSURE: 126 MMHG | DIASTOLIC BLOOD PRESSURE: 78 MMHG | HEIGHT: 69 IN | WEIGHT: 156.5 LBS | TEMPERATURE: 98 F | BODY MASS INDEX: 23.18 KG/M2

## 2020-06-17 DIAGNOSIS — Z12.11 SCREEN FOR COLON CANCER: ICD-10-CM

## 2020-06-17 DIAGNOSIS — Z11.3 SCREEN FOR STD (SEXUALLY TRANSMITTED DISEASE): ICD-10-CM

## 2020-06-17 DIAGNOSIS — Z01.419 ENCOUNTER FOR GYNECOLOGICAL EXAMINATION: ICD-10-CM

## 2020-06-17 DIAGNOSIS — N93.0 POSTCOITAL BLEEDING: ICD-10-CM

## 2020-06-17 DIAGNOSIS — Z78.0 MENOPAUSE: ICD-10-CM

## 2020-06-17 DIAGNOSIS — Z91.89 INCREASED RISK OF BREAST CANCER: Primary | ICD-10-CM

## 2020-06-17 DIAGNOSIS — Z80.3 FAMILY HISTORY OF BREAST CANCER IN MOTHER: ICD-10-CM

## 2020-06-17 DIAGNOSIS — Z76.89 ESTABLISHING CARE WITH NEW DOCTOR, ENCOUNTER FOR: ICD-10-CM

## 2020-06-17 PROCEDURE — 99999 PR PBB SHADOW E&M-EST. PATIENT-LVL V: CPT | Mod: PBBFAC,,, | Performed by: OBSTETRICS & GYNECOLOGY

## 2020-06-17 PROCEDURE — 99999 PR PBB SHADOW E&M-EST. PATIENT-LVL V: ICD-10-PCS | Mod: PBBFAC,,, | Performed by: OBSTETRICS & GYNECOLOGY

## 2020-06-17 PROCEDURE — 99396 PR PREVENTIVE VISIT,EST,40-64: ICD-10-PCS | Mod: S$GLB,,, | Performed by: OBSTETRICS & GYNECOLOGY

## 2020-06-17 PROCEDURE — 87491 CHLMYD TRACH DNA AMP PROBE: CPT

## 2020-06-17 PROCEDURE — 86703 HIV-1/HIV-2 1 RESULT ANTBDY: CPT

## 2020-06-17 PROCEDURE — 86592 SYPHILIS TEST NON-TREP QUAL: CPT

## 2020-06-17 PROCEDURE — 84403 ASSAY OF TOTAL TESTOSTERONE: CPT

## 2020-06-17 PROCEDURE — 87340 HEPATITIS B SURFACE AG IA: CPT

## 2020-06-17 PROCEDURE — 99396 PREV VISIT EST AGE 40-64: CPT | Mod: S$GLB,,, | Performed by: OBSTETRICS & GYNECOLOGY

## 2020-06-17 PROCEDURE — 84402 ASSAY OF FREE TESTOSTERONE: CPT

## 2020-06-17 RX ORDER — ESTRADIOL 2 MG/1
2 TABLET ORAL DAILY
Qty: 90 TABLET | Refills: 3 | Status: SHIPPED | OUTPATIENT
Start: 2020-06-17 | End: 2020-06-17

## 2020-06-17 RX ORDER — PROGESTERONE 200 MG/1
CAPSULE ORAL
Qty: 90 CAPSULE | Refills: 3 | Status: SHIPPED | OUTPATIENT
Start: 2020-06-17 | End: 2020-06-17

## 2020-06-17 RX ORDER — PROGESTERONE 200 MG/1
CAPSULE ORAL
Qty: 90 CAPSULE | Refills: 3 | Status: SHIPPED | OUTPATIENT
Start: 2020-06-17 | End: 2021-05-06 | Stop reason: SDUPTHER

## 2020-06-17 RX ORDER — ESTRADIOL 2 MG/1
2 TABLET ORAL DAILY
Qty: 90 TABLET | Refills: 3 | Status: SHIPPED | OUTPATIENT
Start: 2020-06-17 | End: 2021-05-06 | Stop reason: SDUPTHER

## 2020-06-18 LAB
HBV SURFACE AG SERPL QL IA: NEGATIVE
HIV 1+2 AB+HIV1 P24 AG SERPL QL IA: NEGATIVE
RPR SER QL: NORMAL
TESTOST SERPL-MCNC: 36 NG/DL (ref 5–73)

## 2020-06-19 LAB
C TRACH DNA SPEC QL NAA+PROBE: NOT DETECTED
N GONORRHOEA DNA SPEC QL NAA+PROBE: NOT DETECTED

## 2020-06-20 ENCOUNTER — PATIENT MESSAGE (OUTPATIENT)
Dept: OBSTETRICS AND GYNECOLOGY | Facility: CLINIC | Age: 50
End: 2020-06-20

## 2020-06-23 LAB — TESTOST FREE SERPL-MCNC: 0.9 PG/ML

## 2020-06-25 RX ORDER — METRONIDAZOLE 500 MG/1
500 TABLET ORAL EVERY 12 HOURS
Qty: 14 TABLET | Refills: 0 | Status: SHIPPED | OUTPATIENT
Start: 2020-06-25 | End: 2020-07-02

## 2020-09-18 ENCOUNTER — PATIENT OUTREACH (OUTPATIENT)
Dept: ADMINISTRATIVE | Facility: HOSPITAL | Age: 50
End: 2020-09-18

## 2020-09-18 DIAGNOSIS — Z12.11 COLON CANCER SCREENING: Primary | ICD-10-CM

## 2020-10-02 ENCOUNTER — OFFICE VISIT (OUTPATIENT)
Dept: FAMILY MEDICINE | Facility: CLINIC | Age: 50
End: 2020-10-02
Payer: COMMERCIAL

## 2020-10-02 ENCOUNTER — HOSPITAL ENCOUNTER (OUTPATIENT)
Dept: RADIOLOGY | Facility: HOSPITAL | Age: 50
Discharge: HOME | End: 2020-10-02
Attending: OBSTETRICS & GYNECOLOGY
Payer: COMMERCIAL

## 2020-10-02 VITALS
HEART RATE: 69 BPM | TEMPERATURE: 98 F | SYSTOLIC BLOOD PRESSURE: 110 MMHG | OXYGEN SATURATION: 97 % | DIASTOLIC BLOOD PRESSURE: 86 MMHG | WEIGHT: 159.63 LBS | BODY MASS INDEX: 23.64 KG/M2 | RESPIRATION RATE: 16 BRPM | HEIGHT: 69 IN

## 2020-10-02 DIAGNOSIS — G89.29 CHRONIC PAIN IN LEFT FOOT: ICD-10-CM

## 2020-10-02 DIAGNOSIS — L98.9 INFLAMMATORY DERMATOSIS: ICD-10-CM

## 2020-10-02 DIAGNOSIS — Z91.89 INCREASED RISK OF BREAST CANCER: ICD-10-CM

## 2020-10-02 DIAGNOSIS — Z80.3 FAMILY HISTORY OF BREAST CANCER IN MOTHER: ICD-10-CM

## 2020-10-02 DIAGNOSIS — Z00.00 WELL ADULT EXAM: Primary | ICD-10-CM

## 2020-10-02 DIAGNOSIS — Z76.89 ESTABLISHING CARE WITH NEW DOCTOR, ENCOUNTER FOR: ICD-10-CM

## 2020-10-02 DIAGNOSIS — M79.672 CHRONIC PAIN IN LEFT FOOT: ICD-10-CM

## 2020-10-02 PROCEDURE — 99999 PR PBB SHADOW E&M-EST. PATIENT-LVL V: ICD-10-PCS | Mod: PBBFAC,,, | Performed by: FAMILY MEDICINE

## 2020-10-02 PROCEDURE — 77063 MAMMO DIGITAL SCREENING BILAT WITH TOMOSYNTHESIS_CAD: ICD-10-PCS | Mod: 26,,, | Performed by: RADIOLOGY

## 2020-10-02 PROCEDURE — 3008F BODY MASS INDEX DOCD: CPT | Mod: CPTII,S$GLB,, | Performed by: FAMILY MEDICINE

## 2020-10-02 PROCEDURE — 77067 MAMMO DIGITAL SCREENING BILAT WITH TOMOSYNTHESIS_CAD: ICD-10-PCS | Mod: 26,,, | Performed by: RADIOLOGY

## 2020-10-02 PROCEDURE — 99386 PR PREVENTIVE VISIT,NEW,40-64: ICD-10-PCS | Mod: S$GLB,,, | Performed by: FAMILY MEDICINE

## 2020-10-02 PROCEDURE — 77067 SCR MAMMO BI INCL CAD: CPT | Mod: 26,,, | Performed by: RADIOLOGY

## 2020-10-02 PROCEDURE — 99999 PR PBB SHADOW E&M-EST. PATIENT-LVL V: CPT | Mod: PBBFAC,,, | Performed by: FAMILY MEDICINE

## 2020-10-02 PROCEDURE — 3008F PR BODY MASS INDEX (BMI) DOCUMENTED: ICD-10-PCS | Mod: CPTII,S$GLB,, | Performed by: FAMILY MEDICINE

## 2020-10-02 PROCEDURE — 99386 PREV VISIT NEW AGE 40-64: CPT | Mod: S$GLB,,, | Performed by: FAMILY MEDICINE

## 2020-10-02 PROCEDURE — 77067 SCR MAMMO BI INCL CAD: CPT | Mod: TC

## 2020-10-02 PROCEDURE — 77063 BREAST TOMOSYNTHESIS BI: CPT | Mod: 26,,, | Performed by: RADIOLOGY

## 2020-10-02 RX ORDER — NAPROXEN 500 MG/1
500 TABLET ORAL 2 TIMES DAILY
Qty: 60 TABLET | Refills: 2 | Status: SHIPPED | OUTPATIENT
Start: 2020-10-02 | End: 2021-01-22 | Stop reason: SDUPTHER

## 2020-10-02 RX ORDER — METHYLPREDNISOLONE 4 MG/1
TABLET ORAL
Qty: 1 PACKAGE | Refills: 0 | Status: SHIPPED | OUTPATIENT
Start: 2020-10-02 | End: 2021-02-11

## 2020-10-02 NOTE — PROGRESS NOTES
Health Maintenance Due   Topic     Hepatitis C Screening  Consult with PCP     Shingles Vaccine (1 of 2) hx chickenpox ; inform pt can get vaccine at pharmacy.    Colorectal Cancer Screening  Orders already in ; pt will call to schedule     Mammogram  Done today with [facility]

## 2020-10-02 NOTE — LETTER
October 2, 2020      Gisselle Magdaleno MD  [address]  [address]  [facility] [address]           [facility]  [address] [place] [address]  Phone: [phone]  Fax: [phone]          Patient: Herlinda Ge   MR Number: [id number]   YOB: 1970   Date of Visit: 10/2/2020       Dear Dr. Gisselle Magdaleno:    Thank you for referring Herlinda Ge to me for evaluation. Attached you will find relevant portions of my assessment and plan of care.    If you have questions, please do not hesitate to call me. I look forward to following Herlinda Ge along with you.    Sincerely,    Azikiwe K. Lombard, MD    Enclosure  CC:  No Recipients    If you would like to receive this communication electronically, please contact [email or web address] or [phone] to request more information on [employer] access.    For providers and/or their staff who would like to refer a patient to [facility], please contact us through our one-stop-shop provider referral line, [facility], at [phone].    If you feel you have received this communication in error or would no longer like to receive these types of communications, please e-mail [email or web address]

## 2020-10-02 NOTE — PROGRESS NOTES
"Chief Complaint   Patient presents with    Annual Exam     Physical       Herlinda Ge is a 50 y.o. female who presents to [facility].  Chronic medical issues, if present, have been documented.  Acute medical issues, if present have been documented in the Chief Complaint.     HPI    ROS  Review of Systems   Constitutional: Negative.  Negative for activity change, appetite change, chills, fatigue and fever.   HENT: Negative for congestion, ear pain, hearing loss, postnasal drip, rhinorrhea, sinus pressure, sore throat and trouble swallowing.    Eyes: Negative.  Negative for pain and visual disturbance.   Respiratory: Negative for cough, chest tightness and shortness of breath.    Cardiovascular: Negative for chest pain and leg swelling.   Gastrointestinal: Negative for abdominal pain, constipation, diarrhea, nausea and vomiting.   Endocrine: Negative.    Genitourinary: Negative.  Negative for difficulty urinating, dysuria, flank pain, menstrual problem, pelvic pain and urgency.   Musculoskeletal: Positive for arthralgias (left foot) and gait problem (occasional). Negative for myalgias, neck pain and neck stiffness.   Skin: Negative.  Negative for rash.   Allergic/Immunologic: Negative.  Negative for environmental allergies, food allergies and immunocompromised state.   Neurological: Negative for weakness, light-headedness and headaches.   Hematological: Negative.    Psychiatric/Behavioral: Negative.  Negative for decreased concentration, dysphoric mood and sleep disturbance. The patient is not nervous/anxious.        Physical Exam  Vitals:    10/02/20 1450   BP: 110/86   Pulse:    Resp:    Temp:     Body mass index is 23.57 kg/m².  Weight: 72.4 kg (159 lb 9.8 oz)   Height: 5' 9" (175.3 cm)     Physical Exam  Vitals signs reviewed.   Constitutional:       General: She is not in acute distress.     Appearance: Normal appearance. She is well-developed. She is not ill-appearing, toxic-appearing or " diaphoretic.   HENT:      Head: Normocephalic and atraumatic.      Right Ear: Hearing, tympanic membrane, ear canal and external ear normal. No decreased hearing noted. No tenderness. No foreign body. Tympanic membrane is not injected, scarred, perforated, erythematous, retracted or bulging.      Left Ear: Hearing, tympanic membrane, ear canal and external ear normal. No decreased hearing noted. No tenderness. No foreign body. Tympanic membrane is not injected, scarred, perforated, erythematous, retracted or bulging.      Nose: Nose normal. No nasal deformity or rhinorrhea.      Mouth/Throat:      Dentition: Does not have dentures. No dental caries.      Pharynx: Uvula midline.   Eyes:      General: Lids are normal. No scleral icterus.        Right eye: No foreign body or discharge.         Left eye: No foreign body or discharge.      Conjunctiva/sclera: Conjunctivae normal.      Right eye: No chemosis or exudate.     Left eye: No chemosis or exudate.     Pupils: Pupils are equal, round, and reactive to light.   Neck:      Musculoskeletal: Full passive range of motion without pain, normal range of motion and neck supple.      Thyroid: No thyroid mass or thyromegaly.   Cardiovascular:      Rate and Rhythm: Normal rate and regular rhythm.      Heart sounds: Normal heart sounds, S1 normal and S2 normal. No murmur. No friction rub. No gallop.    Pulmonary:      Effort: Pulmonary effort is normal.      Breath sounds: No decreased breath sounds, wheezing, rhonchi or rales.   Chest:      Chest wall: No mass, deformity or tenderness.   Abdominal:      General: Bowel sounds are normal. There is no distension.      Palpations: Abdomen is soft. Abdomen is not rigid. There is no mass.      Tenderness: There is no abdominal tenderness. There is no guarding or rebound.   Musculoskeletal: Normal range of motion.      Left foot: Normal range of motion and normal capillary refill. Tenderness, bony tenderness and swelling present. No  crepitus, deformity or laceration.   Lymphadenopathy:      Head:      Right side of head: No submental, submandibular, tonsillar, preauricular or posterior auricular adenopathy.      Left side of head: No submental, submandibular, tonsillar, preauricular or posterior auricular adenopathy.      Cervical: No cervical adenopathy.   Skin:     General: Skin is warm and dry.      Coloration: Skin is not pale.      Findings: No rash.   Neurological:      Mental Status: She is alert and oriented to person, place, and time.      Cranial Nerves: No cranial nerve deficit.      Sensory: No sensory deficit.      Motor: No abnormal muscle tone or seizure activity.   Psychiatric:         Attention and Perception: She is attentive.         Speech: Speech normal.         Behavior: Behavior normal. Behavior is cooperative.         Thought Content: Thought content normal.         Judgment: Judgment normal.         Assessment & Plan    Discussion of plan of care including treatment options regarding health and wellness were reviewed and discussed with patient.  Any changes to medication or treatment plan, as well as any screening blood test, imaging, or referrals to specialist, are documented.  Follow up as indicated.     1. Well adult exam  Discussed age and gender appropriate screenings at this visit and encouraged a healthy diet with low saturated fats, and increased physical activity.  Counseled on medically appropriate vaccines based on age and current health condition.  Screening test will be ordered and once completed, patient will be notified of results when available.  If necessary, will follow up to discuss and manage further.   - CBC auto differential; Future  - Comprehensive metabolic panel; Future  - Vitamin D; Future  - TSH; Future  - T4, free; Future  - Hemoglobin A1C; Future  - Lipid Panel; Future    2. Chronic pain in left foot  Patient is advised that arthritis is a result of regular daily use, and is caused by  inflammation in the joint.  Patient was encouraged to exercise as tolerated, and attempt weight loss with sensible diet and lifestyle changes.  Patient was recommended to start NSAID therapy to reduce inflammation, and to incorporate stretching into a daily routine.  Pain medication will be prescribed as necessary.  Patient should follow up if pain is not well controlled.   - naproxen (NAPROSYN) 500 MG tablet; Take 1 tablet (500 mg total) by mouth 2 (two) times daily.  Dispense: 60 tablet; Refill: 2    3. Inflammatory dermatosis  Chronic condition.  Short course of oral steroid given for symptom relief.   - methylPREDNISolone (MEDROL DOSEPACK) 4 mg tablet; use as directed  Dispense: 1 Package; Refill: 0    4. Establishing care with new doctor, encounter for  Referred to primary care by her Gynecologist.  - Ambulatory referral/consult to Internal Medicine       Follow up in about 1 year (around 10/2/2021).      ACTIVE MEDICAL ISSUES:  Documented in Problem List    PAST MEDICAL HISTORY  Documented  .  PAST SURGICAL HISTORY:  Documented    SOCIAL HISTORY:  Documented    FAMILY HISTORY:  Documented    ALLERGIES AND MEDICATIONS: updated and reviewed.  Documented    Health Maintenance       Date Due Completion Date    Hepatitis C Screening 1970 ---    TETANUS VACCINE 01/14/1986 ---    Shingles Vaccine (1 of 2) 01/14/2018 ---    Colorectal Cancer Screening 01/14/2018 ---    Mammogram 09/12/2020 9/12/2019    Pap Smear 06/05/2022 6/5/2019    Lipid Panel 06/06/2024 6/6/2019

## 2021-01-15 ENCOUNTER — TELEPHONE (OUTPATIENT)
Dept: ENDOSCOPY | Facility: HOSPITAL | Age: 51
End: 2021-01-15

## 2021-01-22 DIAGNOSIS — G89.29 CHRONIC PAIN IN LEFT FOOT: ICD-10-CM

## 2021-01-22 DIAGNOSIS — M79.672 CHRONIC PAIN IN LEFT FOOT: ICD-10-CM

## 2021-01-24 RX ORDER — NAPROXEN 500 MG/1
500 TABLET ORAL 2 TIMES DAILY
Qty: 60 TABLET | Refills: 2 | Status: SHIPPED | OUTPATIENT
Start: 2021-01-24 | End: 2021-07-14

## 2021-02-11 ENCOUNTER — OFFICE VISIT (OUTPATIENT)
Dept: FAMILY MEDICINE | Facility: CLINIC | Age: 51
End: 2021-02-11
Payer: COMMERCIAL

## 2021-02-11 VITALS
TEMPERATURE: 98 F | SYSTOLIC BLOOD PRESSURE: 120 MMHG | WEIGHT: 168 LBS | HEIGHT: 69 IN | RESPIRATION RATE: 16 BRPM | DIASTOLIC BLOOD PRESSURE: 86 MMHG | HEART RATE: 79 BPM | BODY MASS INDEX: 24.88 KG/M2 | OXYGEN SATURATION: 99 %

## 2021-02-11 DIAGNOSIS — M54.50 ACUTE MIDLINE LOW BACK PAIN WITHOUT SCIATICA: Primary | ICD-10-CM

## 2021-02-11 PROCEDURE — 99213 PR OFFICE/OUTPT VISIT, EST, LEVL III, 20-29 MIN: ICD-10-PCS | Mod: S$GLB,,, | Performed by: FAMILY MEDICINE

## 2021-02-11 PROCEDURE — 99213 OFFICE O/P EST LOW 20 MIN: CPT | Mod: S$GLB,,, | Performed by: FAMILY MEDICINE

## 2021-02-11 PROCEDURE — 99999 PR PBB SHADOW E&M-EST. PATIENT-LVL III: ICD-10-PCS | Mod: PBBFAC,,, | Performed by: FAMILY MEDICINE

## 2021-02-11 PROCEDURE — 99999 PR PBB SHADOW E&M-EST. PATIENT-LVL III: CPT | Mod: PBBFAC,,, | Performed by: FAMILY MEDICINE

## 2021-02-11 PROCEDURE — 3008F PR BODY MASS INDEX (BMI) DOCUMENTED: ICD-10-PCS | Mod: CPTII,S$GLB,, | Performed by: FAMILY MEDICINE

## 2021-02-11 PROCEDURE — 3008F BODY MASS INDEX DOCD: CPT | Mod: CPTII,S$GLB,, | Performed by: FAMILY MEDICINE

## 2021-02-11 PROCEDURE — 1125F AMNT PAIN NOTED PAIN PRSNT: CPT | Mod: S$GLB,,, | Performed by: FAMILY MEDICINE

## 2021-02-11 PROCEDURE — 1125F PR PAIN SEVERITY QUANTIFIED, PAIN PRESENT: ICD-10-PCS | Mod: S$GLB,,, | Performed by: FAMILY MEDICINE

## 2021-02-11 RX ORDER — AMPHETAMINE 18.8 MG/1
TABLET, ORALLY DISINTEGRATING ORAL DAILY
COMMUNITY
Start: 2021-01-08 | End: 2022-08-30

## 2021-02-17 ENCOUNTER — PATIENT MESSAGE (OUTPATIENT)
Dept: FAMILY MEDICINE | Facility: CLINIC | Age: 51
End: 2021-02-17

## 2021-02-17 DIAGNOSIS — Z11.59 NEED FOR HEPATITIS C SCREENING TEST: ICD-10-CM

## 2021-03-08 ENCOUNTER — PATIENT MESSAGE (OUTPATIENT)
Dept: ADMINISTRATIVE | Facility: HOSPITAL | Age: 51
End: 2021-03-08

## 2021-04-16 ENCOUNTER — PATIENT MESSAGE (OUTPATIENT)
Dept: RESEARCH | Facility: HOSPITAL | Age: 51
End: 2021-04-16

## 2021-04-20 ENCOUNTER — TELEPHONE (OUTPATIENT)
Dept: OBSTETRICS AND GYNECOLOGY | Facility: CLINIC | Age: 51
End: 2021-04-20

## 2021-04-28 ENCOUNTER — OFFICE VISIT (OUTPATIENT)
Dept: FAMILY MEDICINE | Facility: CLINIC | Age: 51
End: 2021-04-28
Payer: COMMERCIAL

## 2021-04-28 VITALS
HEART RATE: 73 BPM | BODY MASS INDEX: 24.29 KG/M2 | TEMPERATURE: 98 F | HEIGHT: 69 IN | WEIGHT: 164 LBS | SYSTOLIC BLOOD PRESSURE: 110 MMHG | DIASTOLIC BLOOD PRESSURE: 86 MMHG | RESPIRATION RATE: 16 BRPM | OXYGEN SATURATION: 98 %

## 2021-04-28 DIAGNOSIS — G89.29 CHRONIC MIDLINE LOW BACK PAIN WITH BILATERAL SCIATICA: ICD-10-CM

## 2021-04-28 DIAGNOSIS — M54.41 CHRONIC MIDLINE LOW BACK PAIN WITH BILATERAL SCIATICA: ICD-10-CM

## 2021-04-28 DIAGNOSIS — M54.42 CHRONIC MIDLINE LOW BACK PAIN WITH BILATERAL SCIATICA: ICD-10-CM

## 2021-04-28 DIAGNOSIS — M79.672 FOOT PAIN, LEFT: Primary | ICD-10-CM

## 2021-04-28 PROCEDURE — 3008F BODY MASS INDEX DOCD: CPT | Mod: CPTII,S$GLB,, | Performed by: INTERNAL MEDICINE

## 2021-04-28 PROCEDURE — 99999 PR PBB SHADOW E&M-EST. PATIENT-LVL IV: ICD-10-PCS | Mod: PBBFAC,,, | Performed by: INTERNAL MEDICINE

## 2021-04-28 PROCEDURE — 99214 PR OFFICE/OUTPT VISIT, EST, LEVL IV, 30-39 MIN: ICD-10-PCS | Mod: S$GLB,,, | Performed by: INTERNAL MEDICINE

## 2021-04-28 PROCEDURE — 1125F PR PAIN SEVERITY QUANTIFIED, PAIN PRESENT: ICD-10-PCS | Mod: S$GLB,,, | Performed by: INTERNAL MEDICINE

## 2021-04-28 PROCEDURE — 3008F PR BODY MASS INDEX (BMI) DOCUMENTED: ICD-10-PCS | Mod: CPTII,S$GLB,, | Performed by: INTERNAL MEDICINE

## 2021-04-28 PROCEDURE — 99214 OFFICE O/P EST MOD 30 MIN: CPT | Mod: S$GLB,,, | Performed by: INTERNAL MEDICINE

## 2021-04-28 PROCEDURE — 1125F AMNT PAIN NOTED PAIN PRSNT: CPT | Mod: S$GLB,,, | Performed by: INTERNAL MEDICINE

## 2021-04-28 PROCEDURE — 99999 PR PBB SHADOW E&M-EST. PATIENT-LVL IV: CPT | Mod: PBBFAC,,, | Performed by: INTERNAL MEDICINE

## 2021-04-28 RX ORDER — FLUVOXAMINE MALEATE 100 MG/1
CAPSULE, EXTENDED RELEASE ORAL
COMMUNITY
Start: 2021-04-06 | End: 2021-11-08

## 2021-04-29 ENCOUNTER — PATIENT MESSAGE (OUTPATIENT)
Dept: ORTHOPEDICS | Facility: CLINIC | Age: 51
End: 2021-04-29

## 2021-05-04 ENCOUNTER — PATIENT MESSAGE (OUTPATIENT)
Dept: ORTHOPEDICS | Facility: CLINIC | Age: 51
End: 2021-05-04

## 2021-05-06 ENCOUNTER — HOSPITAL ENCOUNTER (OUTPATIENT)
Dept: RADIOLOGY | Facility: HOSPITAL | Age: 51
Discharge: HOME | End: 2021-05-06
Attending: ORTHOPAEDIC SURGERY
Payer: COMMERCIAL

## 2021-05-06 ENCOUNTER — OFFICE VISIT (OUTPATIENT)
Dept: ORTHOPEDICS | Facility: CLINIC | Age: 51
End: 2021-05-06
Payer: COMMERCIAL

## 2021-05-06 ENCOUNTER — OFFICE VISIT (OUTPATIENT)
Dept: OBSTETRICS AND GYNECOLOGY | Facility: CLINIC | Age: 51
End: 2021-05-06
Attending: OBSTETRICS & GYNECOLOGY
Payer: COMMERCIAL

## 2021-05-06 VITALS — BODY MASS INDEX: 25.73 KG/M2 | WEIGHT: 173.75 LBS | HEIGHT: 69 IN

## 2021-05-06 VITALS
WEIGHT: 172.38 LBS | DIASTOLIC BLOOD PRESSURE: 74 MMHG | HEIGHT: 69 IN | BODY MASS INDEX: 25.53 KG/M2 | SYSTOLIC BLOOD PRESSURE: 124 MMHG

## 2021-05-06 DIAGNOSIS — M54.42 CHRONIC MIDLINE LOW BACK PAIN WITH BILATERAL SCIATICA: ICD-10-CM

## 2021-05-06 DIAGNOSIS — M51.36 DDD (DEGENERATIVE DISC DISEASE), LUMBAR: ICD-10-CM

## 2021-05-06 DIAGNOSIS — M54.41 CHRONIC MIDLINE LOW BACK PAIN WITH BILATERAL SCIATICA: ICD-10-CM

## 2021-05-06 DIAGNOSIS — G89.29 CHRONIC MIDLINE LOW BACK PAIN WITH BILATERAL SCIATICA: ICD-10-CM

## 2021-05-06 DIAGNOSIS — Z78.0 MENOPAUSE: Primary | ICD-10-CM

## 2021-05-06 DIAGNOSIS — Z01.419 ENCOUNTER FOR GYNECOLOGICAL EXAMINATION: ICD-10-CM

## 2021-05-06 PROCEDURE — 1126F PR PAIN SEVERITY QUANTIFIED, NO PAIN PRESENT: ICD-10-PCS | Mod: S$GLB,,, | Performed by: OBSTETRICS & GYNECOLOGY

## 2021-05-06 PROCEDURE — 72110 X-RAY EXAM L-2 SPINE 4/>VWS: CPT | Mod: TC

## 2021-05-06 PROCEDURE — 3008F BODY MASS INDEX DOCD: CPT | Mod: CPTII,S$GLB,, | Performed by: OBSTETRICS & GYNECOLOGY

## 2021-05-06 PROCEDURE — 72110 X-RAY EXAM L-2 SPINE 4/>VWS: CPT | Mod: 26,,, | Performed by: RADIOLOGY

## 2021-05-06 PROCEDURE — 99204 PR OFFICE/OUTPT VISIT, NEW, LEVL IV, 45-59 MIN: ICD-10-PCS | Mod: S$GLB,,, | Performed by: ORTHOPAEDIC SURGERY

## 2021-05-06 PROCEDURE — 99204 OFFICE O/P NEW MOD 45 MIN: CPT | Mod: S$GLB,,, | Performed by: ORTHOPAEDIC SURGERY

## 2021-05-06 PROCEDURE — 99999 PR PBB SHADOW E&M-EST. PATIENT-LVL III: CPT | Mod: PBBFAC,,, | Performed by: ORTHOPAEDIC SURGERY

## 2021-05-06 PROCEDURE — 3008F BODY MASS INDEX DOCD: CPT | Mod: CPTII,S$GLB,, | Performed by: ORTHOPAEDIC SURGERY

## 2021-05-06 PROCEDURE — 99999 PR PBB SHADOW E&M-EST. PATIENT-LVL III: ICD-10-PCS | Mod: PBBFAC,,, | Performed by: ORTHOPAEDIC SURGERY

## 2021-05-06 PROCEDURE — 99396 PR PREVENTIVE VISIT,EST,40-64: ICD-10-PCS | Mod: S$GLB,,, | Performed by: OBSTETRICS & GYNECOLOGY

## 2021-05-06 PROCEDURE — 99999 PR PBB SHADOW E&M-EST. PATIENT-LVL III: CPT | Mod: PBBFAC,,, | Performed by: OBSTETRICS & GYNECOLOGY

## 2021-05-06 PROCEDURE — 1125F AMNT PAIN NOTED PAIN PRSNT: CPT | Mod: S$GLB,,, | Performed by: ORTHOPAEDIC SURGERY

## 2021-05-06 PROCEDURE — 1126F AMNT PAIN NOTED NONE PRSNT: CPT | Mod: S$GLB,,, | Performed by: OBSTETRICS & GYNECOLOGY

## 2021-05-06 PROCEDURE — 72110 XR LUMBAR SPINE AP AND LAT WITH FLEX/EXT: ICD-10-PCS | Mod: 26,,, | Performed by: RADIOLOGY

## 2021-05-06 PROCEDURE — 1125F PR PAIN SEVERITY QUANTIFIED, PAIN PRESENT: ICD-10-PCS | Mod: S$GLB,,, | Performed by: ORTHOPAEDIC SURGERY

## 2021-05-06 PROCEDURE — 99396 PREV VISIT EST AGE 40-64: CPT | Mod: S$GLB,,, | Performed by: OBSTETRICS & GYNECOLOGY

## 2021-05-06 PROCEDURE — 3008F PR BODY MASS INDEX (BMI) DOCUMENTED: ICD-10-PCS | Mod: CPTII,S$GLB,, | Performed by: OBSTETRICS & GYNECOLOGY

## 2021-05-06 PROCEDURE — 99999 PR PBB SHADOW E&M-EST. PATIENT-LVL III: ICD-10-PCS | Mod: PBBFAC,,, | Performed by: OBSTETRICS & GYNECOLOGY

## 2021-05-06 PROCEDURE — 3008F PR BODY MASS INDEX (BMI) DOCUMENTED: ICD-10-PCS | Mod: CPTII,S$GLB,, | Performed by: ORTHOPAEDIC SURGERY

## 2021-05-06 RX ORDER — PROGESTERONE 200 MG/1
CAPSULE ORAL
Qty: 90 CAPSULE | Refills: 3 | Status: SHIPPED | OUTPATIENT
Start: 2021-05-06 | End: 2021-11-08 | Stop reason: SDUPTHER

## 2021-05-06 RX ORDER — ESTRADIOL 2 MG/1
2 TABLET ORAL DAILY
Qty: 90 TABLET | Refills: 3 | Status: SHIPPED | OUTPATIENT
Start: 2021-05-06 | End: 2021-11-08 | Stop reason: SDUPTHER

## 2021-05-21 ENCOUNTER — TELEPHONE (OUTPATIENT)
Dept: SPORTS MEDICINE | Facility: CLINIC | Age: 51
End: 2021-05-21

## 2021-05-31 ENCOUNTER — TELEPHONE (OUTPATIENT)
Dept: ORTHOPEDICS | Facility: CLINIC | Age: 51
End: 2021-05-31

## 2021-06-03 DIAGNOSIS — M79.672 LEFT FOOT PAIN: Primary | ICD-10-CM

## 2021-06-06 ENCOUNTER — PATIENT MESSAGE (OUTPATIENT)
Dept: ORTHOPEDICS | Facility: CLINIC | Age: 51
End: 2021-06-06

## 2021-06-07 ENCOUNTER — TELEPHONE (OUTPATIENT)
Dept: ORTHOPEDICS | Facility: CLINIC | Age: 51
End: 2021-06-07

## 2021-06-07 ENCOUNTER — PATIENT MESSAGE (OUTPATIENT)
Dept: ORTHOPEDICS | Facility: CLINIC | Age: 51
End: 2021-06-07

## 2021-06-09 ENCOUNTER — PATIENT OUTREACH (OUTPATIENT)
Dept: ADMINISTRATIVE | Facility: OTHER | Age: 51
End: 2021-06-09

## 2021-06-10 ENCOUNTER — OFFICE VISIT (OUTPATIENT)
Dept: ORTHOPEDICS | Facility: CLINIC | Age: 51
End: 2021-06-10
Payer: COMMERCIAL

## 2021-06-10 ENCOUNTER — HOSPITAL ENCOUNTER (OUTPATIENT)
Dept: RADIOLOGY | Facility: HOSPITAL | Age: 51
Discharge: HOME | End: 2021-06-10
Attending: ORTHOPAEDIC SURGERY
Payer: COMMERCIAL

## 2021-06-10 DIAGNOSIS — M19.072 OSTEOARTHRITIS OF MIDFOOT, LEFT: Primary | ICD-10-CM

## 2021-06-10 DIAGNOSIS — M79.672 FOOT PAIN, LEFT: ICD-10-CM

## 2021-06-10 DIAGNOSIS — M79.672 LEFT FOOT PAIN: ICD-10-CM

## 2021-06-10 PROCEDURE — 1125F AMNT PAIN NOTED PAIN PRSNT: CPT | Mod: S$GLB,,, | Performed by: ORTHOPAEDIC SURGERY

## 2021-06-10 PROCEDURE — 99999 PR PBB SHADOW E&M-EST. PATIENT-LVL III: ICD-10-PCS | Mod: PBBFAC,,, | Performed by: ORTHOPAEDIC SURGERY

## 2021-06-10 PROCEDURE — 73630 X-RAY EXAM OF FOOT: CPT | Mod: 26,LT,, | Performed by: RADIOLOGY

## 2021-06-10 PROCEDURE — 73630 X-RAY EXAM OF FOOT: CPT | Mod: TC,LT

## 2021-06-10 PROCEDURE — 99999 PR PBB SHADOW E&M-EST. PATIENT-LVL III: CPT | Mod: PBBFAC,,, | Performed by: ORTHOPAEDIC SURGERY

## 2021-06-10 PROCEDURE — 99214 PR OFFICE/OUTPT VISIT, EST, LEVL IV, 30-39 MIN: ICD-10-PCS | Mod: S$GLB,,, | Performed by: ORTHOPAEDIC SURGERY

## 2021-06-10 PROCEDURE — 1125F PR PAIN SEVERITY QUANTIFIED, PAIN PRESENT: ICD-10-PCS | Mod: S$GLB,,, | Performed by: ORTHOPAEDIC SURGERY

## 2021-06-10 PROCEDURE — 73630 XR FOOT COMPLETE 3 VIEW LEFT: ICD-10-PCS | Mod: 26,LT,, | Performed by: RADIOLOGY

## 2021-06-10 PROCEDURE — 99214 OFFICE O/P EST MOD 30 MIN: CPT | Mod: S$GLB,,, | Performed by: ORTHOPAEDIC SURGERY

## 2021-07-07 ENCOUNTER — PATIENT MESSAGE (OUTPATIENT)
Dept: ORTHOPEDICS | Facility: CLINIC | Age: 51
End: 2021-07-07

## 2021-08-19 ENCOUNTER — PATIENT MESSAGE (OUTPATIENT)
Dept: ADMINISTRATIVE | Facility: HOSPITAL | Age: 51
End: 2021-08-19

## 2021-08-19 ENCOUNTER — PATIENT OUTREACH (OUTPATIENT)
Dept: ADMINISTRATIVE | Facility: HOSPITAL | Age: 51
End: 2021-08-19

## 2021-11-07 ENCOUNTER — PATIENT OUTREACH (OUTPATIENT)
Dept: ADMINISTRATIVE | Facility: OTHER | Age: 51
End: 2021-11-07

## 2021-11-07 DIAGNOSIS — Z12.11 ENCOUNTER FOR FIT (FECAL IMMUNOCHEMICAL TEST) SCREENING: ICD-10-CM

## 2021-11-07 DIAGNOSIS — Z12.31 ENCOUNTER FOR SCREENING MAMMOGRAM FOR MALIGNANT NEOPLASM OF BREAST: Primary | ICD-10-CM

## 2021-11-08 ENCOUNTER — OFFICE VISIT (OUTPATIENT)
Dept: OBSTETRICS AND GYNECOLOGY | Facility: CLINIC | Age: 51
End: 2021-11-08
Attending: OBSTETRICS & GYNECOLOGY

## 2021-11-08 VITALS
WEIGHT: 174.19 LBS | BODY MASS INDEX: 25.8 KG/M2 | SYSTOLIC BLOOD PRESSURE: 122 MMHG | DIASTOLIC BLOOD PRESSURE: 80 MMHG | HEIGHT: 69 IN

## 2021-11-08 DIAGNOSIS — Z78.0 MENOPAUSE: Primary | ICD-10-CM

## 2021-11-08 PROCEDURE — 99213 OFFICE O/P EST LOW 20 MIN: CPT | Mod: PBBFAC | Performed by: OBSTETRICS & GYNECOLOGY

## 2021-11-08 PROCEDURE — 99214 OFFICE O/P EST MOD 30 MIN: CPT | Mod: S$PBB,,, | Performed by: OBSTETRICS & GYNECOLOGY

## 2021-11-08 PROCEDURE — 99999 PR PBB SHADOW E&M-EST. PATIENT-LVL III: CPT | Mod: PBBFAC,,, | Performed by: OBSTETRICS & GYNECOLOGY

## 2021-11-08 PROCEDURE — 99214 PR OFFICE/OUTPT VISIT, EST, LEVL IV, 30-39 MIN: ICD-10-PCS | Mod: S$PBB,,, | Performed by: OBSTETRICS & GYNECOLOGY

## 2021-11-08 PROCEDURE — 99999 PR PBB SHADOW E&M-EST. PATIENT-LVL III: ICD-10-PCS | Mod: PBBFAC,,, | Performed by: OBSTETRICS & GYNECOLOGY

## 2021-11-08 RX ORDER — FLUVOXAMINE MALEATE 150 MG/1
CAPSULE, EXTENDED RELEASE ORAL
COMMUNITY
Start: 2021-09-14 | End: 2021-11-08

## 2021-11-08 RX ORDER — ESTRADIOL 2 MG/1
2 TABLET ORAL DAILY
Qty: 90 TABLET | Refills: 1 | Status: SHIPPED | OUTPATIENT
Start: 2021-11-08 | End: 2022-07-14

## 2021-11-08 RX ORDER — PROGESTERONE 200 MG/1
CAPSULE ORAL
Qty: 90 CAPSULE | Refills: 1 | Status: SHIPPED | OUTPATIENT
Start: 2021-11-08 | End: 2022-07-05

## 2021-12-01 ENCOUNTER — TELEPHONE (OUTPATIENT)
Dept: OBSTETRICS AND GYNECOLOGY | Facility: CLINIC | Age: 51
End: 2021-12-01

## 2021-12-03 LAB
TESTOST FREE SERPL-MCNC: 1.8 PG/ML (ref 0–4.2)
TESTOST SERPL-MCNC: 39 NG/DL (ref 4–50)

## 2021-12-14 ENCOUNTER — PATIENT MESSAGE (OUTPATIENT)
Dept: OBSTETRICS AND GYNECOLOGY | Facility: CLINIC | Age: 51
End: 2021-12-14

## 2021-12-16 ENCOUNTER — TELEPHONE (OUTPATIENT)
Dept: OBSTETRICS AND GYNECOLOGY | Facility: CLINIC | Age: 51
End: 2021-12-16

## 2022-04-14 ENCOUNTER — PATIENT MESSAGE (OUTPATIENT)
Dept: OBSTETRICS AND GYNECOLOGY | Facility: CLINIC | Age: 52
End: 2022-04-14
Payer: COMMERCIAL

## 2022-05-30 ENCOUNTER — PATIENT MESSAGE (OUTPATIENT)
Dept: ADMINISTRATIVE | Facility: HOSPITAL | Age: 52
End: 2022-05-30
Payer: COMMERCIAL

## 2022-06-21 NOTE — TELEPHONE ENCOUNTER
Jim's aunt dropped an echo report done on Jim in [place] on 6/8/2022.  In the echo report, the name is spelled Cole Macario.  Jim's past medical history is:  -Bicuspid aortic valve with annular hypoplasia.  -S/P aortic valve ballooning 10/29/12 at about 1 1/1 y.o.. Even an oversized balloon did not decrease a moderate gradient at that time. Mild post ballooning insufficiency.  -S/P Ross -Konno with a 15 mm pulmonary homograft 1/30/14 at about 2 1/2 years old. Preop aortic annulus Z score was about -5.  -Postpericardiotomy syndrome required steroid therapy, resolved.  -Cardiac cath in at [facility] in [place] 3/20/16 showed systemic RV pressures ( 103/8 ) ,PS gradient of 45 with elevated LPAP at 58/18 , mean 39  elevated LPA wedge at 22/13, mean 16, elevated RAP at 16/12, mean 14. LVP was 125/7 with 20 mmHg AS gradient. AO showed a wide pulse pressure of 105/40,mean 70mmHg. RP was 7.5 Wood units with RP/RS of 0.75.  Angios showed bilateral SVCs with no communicating vein, shrinkage of entire MPA conduit & moderate aortic insufficiency. Ventricular contractility was normal with biventricular enlargement.Normal pulmonary pressures  -9/22/16: Neoaortic valvuloplasty ( retracted ,deformed left cusp !!!) , Hemopatch augmentation and resuspension, RV-PA conduit replacement with insertion of 23 pericardial valve.     My last face-to-face evaluation was on 1/26/2017.  Since then she moved to live with her family in [place] where a pediatric cardiologist performs an echocardiogram about every 6 months.  Pt's aunt who lives nearby drops  the report at our office for our inspection. The results are documented in this chart with our recommendation reported to the aunt.    The newest echocardiogram done on 6/8/2022 by Dr.Nael Demetrio Felix is reported to show tricuspid insufficiency gradient of 36 mmHg and pulmonary stenosis peak gradient of 23 mmHg, both unchanged from previously.   left  Swing pt, asking if its ok if she doesn't take her progesterone and estradiol today and tomorrow. Pt left home to fly out of state for the weeken and forgot her medication. Pt's flight leaves at Noon.   ventricular end-diastolic diameter of 4 cm with this diameter measuring between 4.4 cm to 4.7 cm on the several previous echo.  Left ventricular ejection fraction is 72% with previous evaluation showing values of 62-72%.  Left ventricular posterior wall and interventricular septal thickness 6 mm.    The aortic stenosis gradient has increased some to 50 mmHg peak and  insufficiency is described to be mild.  The mean gradient was not reported.    Pt's aunt, Mrs. Rosita zamora, Reports that the patient has mild symptoms during exercise that started about a month ago.  The symptoms are intermittent and not consistent.  She does not complain of palpitations chest pain or unusual shortness of breath.  Our recommendation was to watch for symptoms and if they increase, an evaluation for possible surgery will be recommended.  Otherwise, another echocardiogram in 6 months.  I informed her also that I am close to custodial and that her future follow-ups will be with my colleague Dr. Yola Soler.  I will sign off the patient to Dr. Soler.

## 2022-07-15 ENCOUNTER — PATIENT OUTREACH (OUTPATIENT)
Dept: ADMINISTRATIVE | Facility: HOSPITAL | Age: 52
End: 2022-07-15
Payer: COMMERCIAL

## 2022-07-15 NOTE — PROGRESS NOTES
Called patient to establish care with a new provider  Patient will like to see Dr. Chambers-no appointment needed at the moment

## 2022-07-25 ENCOUNTER — PATIENT MESSAGE (OUTPATIENT)
Dept: ADMINISTRATIVE | Facility: HOSPITAL | Age: 52
End: 2022-07-25
Payer: COMMERCIAL

## 2022-08-29 ENCOUNTER — PATIENT MESSAGE (OUTPATIENT)
Dept: OBSTETRICS AND GYNECOLOGY | Facility: CLINIC | Age: 52
End: 2022-08-29
Payer: COMMERCIAL

## 2022-08-29 ENCOUNTER — TELEPHONE (OUTPATIENT)
Dept: OBSTETRICS AND GYNECOLOGY | Facility: CLINIC | Age: 52
End: 2022-08-29
Payer: COMMERCIAL

## 2022-08-30 ENCOUNTER — OFFICE VISIT (OUTPATIENT)
Dept: OBSTETRICS AND GYNECOLOGY | Facility: CLINIC | Age: 52
End: 2022-08-30
Attending: OBSTETRICS & GYNECOLOGY
Payer: COMMERCIAL

## 2022-08-30 VITALS
DIASTOLIC BLOOD PRESSURE: 82 MMHG | WEIGHT: 187.38 LBS | BODY MASS INDEX: 27.75 KG/M2 | HEIGHT: 69 IN | SYSTOLIC BLOOD PRESSURE: 124 MMHG

## 2022-08-30 DIAGNOSIS — Z78.0 MENOPAUSE: ICD-10-CM

## 2022-08-30 DIAGNOSIS — N95.0 POSTMENOPAUSAL BLEEDING: Primary | ICD-10-CM

## 2022-08-30 PROCEDURE — 3008F BODY MASS INDEX DOCD: CPT | Mod: CPTII,S$GLB,, | Performed by: OBSTETRICS & GYNECOLOGY

## 2022-08-30 PROCEDURE — 3008F PR BODY MASS INDEX (BMI) DOCUMENTED: ICD-10-PCS | Mod: CPTII,S$GLB,, | Performed by: OBSTETRICS & GYNECOLOGY

## 2022-08-30 PROCEDURE — 3074F PR MOST RECENT SYSTOLIC BLOOD PRESSURE < 130 MM HG: ICD-10-PCS | Mod: CPTII,S$GLB,, | Performed by: OBSTETRICS & GYNECOLOGY

## 2022-08-30 PROCEDURE — 1159F PR MEDICATION LIST DOCUMENTED IN MEDICAL RECORD: ICD-10-PCS | Mod: CPTII,S$GLB,, | Performed by: OBSTETRICS & GYNECOLOGY

## 2022-08-30 PROCEDURE — 99999 PR PBB SHADOW E&M-EST. PATIENT-LVL III: CPT | Mod: PBBFAC,,, | Performed by: OBSTETRICS & GYNECOLOGY

## 2022-08-30 PROCEDURE — 99213 OFFICE O/P EST LOW 20 MIN: CPT | Mod: S$GLB,,, | Performed by: OBSTETRICS & GYNECOLOGY

## 2022-08-30 PROCEDURE — 99213 PR OFFICE/OUTPT VISIT, EST, LEVL III, 20-29 MIN: ICD-10-PCS | Mod: S$GLB,,, | Performed by: OBSTETRICS & GYNECOLOGY

## 2022-08-30 PROCEDURE — 99999 PR PBB SHADOW E&M-EST. PATIENT-LVL III: ICD-10-PCS | Mod: PBBFAC,,, | Performed by: OBSTETRICS & GYNECOLOGY

## 2022-08-30 PROCEDURE — 3079F DIAST BP 80-89 MM HG: CPT | Mod: CPTII,S$GLB,, | Performed by: OBSTETRICS & GYNECOLOGY

## 2022-08-30 PROCEDURE — 3079F PR MOST RECENT DIASTOLIC BLOOD PRESSURE 80-89 MM HG: ICD-10-PCS | Mod: CPTII,S$GLB,, | Performed by: OBSTETRICS & GYNECOLOGY

## 2022-08-30 PROCEDURE — 3074F SYST BP LT 130 MM HG: CPT | Mod: CPTII,S$GLB,, | Performed by: OBSTETRICS & GYNECOLOGY

## 2022-08-30 PROCEDURE — 1159F MED LIST DOCD IN RCRD: CPT | Mod: CPTII,S$GLB,, | Performed by: OBSTETRICS & GYNECOLOGY

## 2022-08-30 RX ORDER — ESCITALOPRAM OXALATE 10 MG/1
10 TABLET ORAL NIGHTLY
COMMUNITY
Start: 2022-06-12 | End: 2022-08-30

## 2022-08-30 RX ORDER — ESTRADIOL 2 MG/1
2 TABLET ORAL DAILY
Qty: 90 TABLET | Refills: 1 | Status: SHIPPED | OUTPATIENT
Start: 2022-08-30 | End: 2022-11-18

## 2022-08-30 RX ORDER — PROGESTERONE 200 MG/1
CAPSULE ORAL
Qty: 90 CAPSULE | Refills: 1 | Status: SHIPPED | OUTPATIENT
Start: 2022-08-30 | End: 2022-11-18

## 2022-08-30 RX ORDER — DEXTROAMPHETAMINE SACCHARATE, AMPHETAMINE ASPARTATE, DEXTROAMPHETAMINE SULFATE AND AMPHETAMINE SULFATE 2.5; 2.5; 2.5; 2.5 MG/1; MG/1; MG/1; MG/1
1 TABLET ORAL 2 TIMES DAILY
COMMUNITY
Start: 2022-08-03

## 2022-08-30 NOTE — PROGRESS NOTES
Subjective:      Herlinda Ge is a 52 y.o. female who is here for follow-up of hormone replacement therapy.  At her last visit on 2021, she said   she had occasional hot flashes but no other menopausal symptoms.  She was happy with current treatment.  She had the following side effects: none.  Patient denied post-menopausal vaginal bleeding. The patient is sexually active.     PLAN on 2021:  Estradiol 2 mg QAM  Progesterone 200 mg QPM  Testosterone 2% cream    She is currently taking the above.  She complains of weight gain.  She spots every few months.  She may have missed a few pills.  She is always sweaty.  No flashes.  No vaginal dryness or dyspareunia.  Sleeping well.  Sex drive not great due to weight gain.  She had the following side effects: none.  The patient is sexually active.     No visits with results within 3 Month(s) from this visit.   Latest known visit with results is:   Office Visit on 2021   Component Date Value Ref Range Status    Testosterone 2021 39  4 - 50 ng/dL Final    Testosterone, Free 2021 1.8  0.0 - 4.2 pg/mL Final       Past Medical History:   Diagnosis Date    ADHD     Fracture, vertebral, lumbar closed     History of anxiety     History of depression     History of eating disorder     Hormone replacement therapy (HRT)     Menopause      Past Surgical History:   Procedure Laterality Date     SECTION      Boy    KNEE ARTHROSCOPY Left     TENDON REPAIR Left     ankle     TONSILLECTOMY       Social History     Tobacco Use    Smoking status: Never    Smokeless tobacco: Never   Substance Use Topics    Alcohol use: Yes     Alcohol/week: 0.0 standard drinks     Comment: rare    Drug use: No     Family History   Problem Relation Age of Onset    Heart attack Father     Breast cancer Mother 71    Colon cancer Neg Hx     Ovarian cancer Neg Hx     Diabetes Neg Hx     Hypertension Neg Hx     Stroke Neg Hx      OB History    Para Term  AB  "Living   2 1 1   1 1   SAB IAB Ectopic Multiple Live Births           1      # Outcome Date GA Lbr Florentino/2nd Weight Sex Delivery Anes PTL Lv   2 Term 03/20/01 40w0d  4.366 kg (9 lb 10 oz) M CS-LTranv   GELA      Complications: GDM (gestational diabetes mellitus)   1 AB 1987              Obstetric Comments   menarche 10       Current Outpatient Medications:     CMPD testosterone proprionate 2% in vanicream, Apply topically. Apply 1 gram as directed 2-3 times weekly., Disp: , Rfl:     dextroamphetamine-amphetamine 10 mg Tab, Take 1 tablet by mouth 2 (two) times daily., Disp: , Rfl:     naproxen (NAPROSYN) 500 MG tablet, TAKE 1 TABLET(500 MG) BY MOUTH TWICE DAILY, Disp: 60 tablet, Rfl: 2    estradioL (ESTRACE) 2 MG tablet, Take 1 tablet (2 mg total) by mouth once daily., Disp: 90 tablet, Rfl: 1    progesterone (PROMETRIUM) 200 MG capsule, TAKE 1 CAPSULE BY MOUTH 30-60 MINTUES BEFORE BED EVERY NIGHT, Disp: 90 capsule, Rfl: 1    Vitals:    08/30/22 1454   BP: 124/82   Weight: 85 kg (187 lb 6.3 oz)   Height: 5' 9" (1.753 m)   PainSc: 0-No pain     Body mass index is 27.67 kg/m².       Assessment:    Postmenopausal bleeding  -     US Pelvis Comp with Transvag NON-OB (xpd; Future; Expected date: 08/30/2022    Menopause  -     estradioL (ESTRACE) 2 MG tablet; Take 1 tablet (2 mg total) by mouth once daily.  Dispense: 90 tablet; Refill: 1  -     progesterone (PROMETRIUM) 200 MG capsule; TAKE 1 CAPSULE BY MOUTH 30-60 MINTUES BEFORE BED EVERY NIGHT  Dispense: 90 capsule; Refill: 1      Plan:   Risks and benefits of hormone replacement therapy were discussed.  Hormone replacement therapy options, including bioidentical versus non-bioidentical hormones, as well as alternatives discussed.  I reviewed the patient's last free and total testosterone.  Continue:  Estradiol 2 mg QAM  Progesterone 200 mg QPM  Increase:  Testosterone to 3% cream  I recommend 30 minutes 5 days a week of moderate intensity aerobics (fast walking, treadmill, " biking, etc.) and weight-bearing exercise (free weights, machines, tension bands, etc.) at least twice weekly for overall health benefits and the prevention and/or treatment of osteoporosis.  Discussed Women, Food, and Hormones  Follow-up for annual first available and HRT 6 months  Instructed patient to call if she experiences any side effects or has any questions.

## 2022-09-02 ENCOUNTER — PATIENT MESSAGE (OUTPATIENT)
Dept: FAMILY MEDICINE | Facility: CLINIC | Age: 52
End: 2022-09-02
Payer: COMMERCIAL

## 2022-09-16 ENCOUNTER — HOSPITAL ENCOUNTER (OUTPATIENT)
Dept: RADIOLOGY | Facility: HOSPITAL | Age: 52
Discharge: HOME | End: 2022-09-16
Attending: OBSTETRICS & GYNECOLOGY
Payer: COMMERCIAL

## 2022-09-16 DIAGNOSIS — N95.0 POSTMENOPAUSAL BLEEDING: ICD-10-CM

## 2022-09-16 PROCEDURE — 76830 TRANSVAGINAL US NON-OB: CPT | Mod: TC

## 2022-09-16 PROCEDURE — 76856 US EXAM PELVIC COMPLETE: CPT | Mod: 26,,, | Performed by: RADIOLOGY

## 2022-09-16 PROCEDURE — 76830 US PELVIS COMP WITH TRANSVAG NON-OB (XPD): ICD-10-PCS | Mod: 26,,, | Performed by: RADIOLOGY

## 2022-09-16 PROCEDURE — 76830 TRANSVAGINAL US NON-OB: CPT | Mod: 26,,, | Performed by: RADIOLOGY

## 2022-09-16 PROCEDURE — 76856 US PELVIS COMP WITH TRANSVAG NON-OB (XPD): ICD-10-PCS | Mod: 26,,, | Performed by: RADIOLOGY

## 2022-11-18 ENCOUNTER — OFFICE VISIT (OUTPATIENT)
Dept: FAMILY MEDICINE | Facility: CLINIC | Age: 52
End: 2022-11-18
Payer: COMMERCIAL

## 2022-11-18 ENCOUNTER — LAB VISIT (OUTPATIENT)
Dept: LAB | Facility: HOSPITAL | Age: 52
End: 2022-11-18
Attending: INTERNAL MEDICINE
Payer: COMMERCIAL

## 2022-11-18 VITALS
TEMPERATURE: 98 F | WEIGHT: 194.25 LBS | BODY MASS INDEX: 28.77 KG/M2 | HEART RATE: 65 BPM | RESPIRATION RATE: 16 BRPM | HEIGHT: 69 IN | DIASTOLIC BLOOD PRESSURE: 79 MMHG | SYSTOLIC BLOOD PRESSURE: 122 MMHG | OXYGEN SATURATION: 98 %

## 2022-11-18 DIAGNOSIS — E55.9 VITAMIN D DEFICIENCY: ICD-10-CM

## 2022-11-18 DIAGNOSIS — Z00.00 ENCOUNTER FOR ANNUAL PHYSICAL EXAM: ICD-10-CM

## 2022-11-18 DIAGNOSIS — E61.1 IRON DEFICIENCY: ICD-10-CM

## 2022-11-18 DIAGNOSIS — M79.672 FOOT PAIN, LEFT: ICD-10-CM

## 2022-11-18 DIAGNOSIS — M54.42 CHRONIC MIDLINE LOW BACK PAIN WITH BILATERAL SCIATICA: ICD-10-CM

## 2022-11-18 DIAGNOSIS — Z23 NEED FOR SHINGLES VACCINE: ICD-10-CM

## 2022-11-18 DIAGNOSIS — Z12.11 SCREENING FOR MALIGNANT NEOPLASM OF COLON: ICD-10-CM

## 2022-11-18 DIAGNOSIS — M54.41 CHRONIC MIDLINE LOW BACK PAIN WITH BILATERAL SCIATICA: ICD-10-CM

## 2022-11-18 DIAGNOSIS — G89.29 CHRONIC MIDLINE LOW BACK PAIN WITH BILATERAL SCIATICA: ICD-10-CM

## 2022-11-18 DIAGNOSIS — Z00.00 ENCOUNTER FOR ANNUAL PHYSICAL EXAM: Primary | ICD-10-CM

## 2022-11-18 LAB
25(OH)D3+25(OH)D2 SERPL-MCNC: 54 NG/ML (ref 30–96)
ALBUMIN SERPL BCP-MCNC: 4.3 G/DL (ref 3.5–5.2)
ALP SERPL-CCNC: 77 U/L (ref 55–135)
ALT SERPL W/O P-5'-P-CCNC: 13 U/L (ref 10–44)
ANION GAP SERPL CALC-SCNC: 7 MMOL/L (ref 8–16)
AST SERPL-CCNC: 21 U/L (ref 10–40)
BASOPHILS # BLD AUTO: 0.05 K/UL (ref 0–0.2)
BASOPHILS NFR BLD: 0.8 % (ref 0–1.9)
BILIRUB SERPL-MCNC: 0.4 MG/DL (ref 0.1–1)
BUN SERPL-MCNC: 22 MG/DL (ref 6–20)
CALCIUM SERPL-MCNC: 9.8 MG/DL (ref 8.7–10.5)
CHLORIDE SERPL-SCNC: 104 MMOL/L (ref 95–110)
CHOLEST SERPL-MCNC: 250 MG/DL (ref 120–199)
CHOLEST/HDLC SERPL: 4.2 {RATIO} (ref 2–5)
CO2 SERPL-SCNC: 27 MMOL/L (ref 23–29)
CREAT SERPL-MCNC: 0.8 MG/DL (ref 0.5–1.4)
DIFFERENTIAL METHOD: ABNORMAL
EOSINOPHIL # BLD AUTO: 0.3 K/UL (ref 0–0.5)
EOSINOPHIL NFR BLD: 4.8 % (ref 0–8)
ERYTHROCYTE [DISTWIDTH] IN BLOOD BY AUTOMATED COUNT: 12.3 % (ref 11.5–14.5)
EST. GFR  (NO RACE VARIABLE): >60 ML/MIN/1.73 M^2
ESTIMATED AVG GLUCOSE: 103 MG/DL (ref 68–131)
FERRITIN SERPL-MCNC: 94 NG/ML (ref 20–300)
GLUCOSE SERPL-MCNC: 86 MG/DL (ref 70–110)
HBA1C MFR BLD: 5.2 % (ref 4–5.6)
HCT VFR BLD AUTO: 50.3 % (ref 37–48.5)
HCV AB SERPL QL IA: NORMAL
HDLC SERPL-MCNC: 59 MG/DL (ref 40–75)
HDLC SERPL: 23.6 % (ref 20–50)
HGB BLD-MCNC: 16.8 G/DL (ref 12–16)
IMM GRANULOCYTES # BLD AUTO: 0.01 K/UL (ref 0–0.04)
IMM GRANULOCYTES NFR BLD AUTO: 0.2 % (ref 0–0.5)
IRON SERPL-MCNC: 69 UG/DL (ref 30–160)
LDLC SERPL CALC-MCNC: 145.2 MG/DL (ref 63–159)
LYMPHOCYTES # BLD AUTO: 1.9 K/UL (ref 1–4.8)
LYMPHOCYTES NFR BLD: 29.6 % (ref 18–48)
MCH RBC QN AUTO: 29.6 PG (ref 27–31)
MCHC RBC AUTO-ENTMCNC: 33.4 G/DL (ref 32–36)
MCV RBC AUTO: 89 FL (ref 82–98)
MONOCYTES # BLD AUTO: 0.4 K/UL (ref 0.3–1)
MONOCYTES NFR BLD: 6.8 % (ref 4–15)
NEUTROPHILS # BLD AUTO: 3.6 K/UL (ref 1.8–7.7)
NEUTROPHILS NFR BLD: 57.8 % (ref 38–73)
NONHDLC SERPL-MCNC: 191 MG/DL
NRBC BLD-RTO: 0 /100 WBC
PLATELET # BLD AUTO: 318 K/UL (ref 150–450)
PMV BLD AUTO: 10.1 FL (ref 9.2–12.9)
POTASSIUM SERPL-SCNC: 4.6 MMOL/L (ref 3.5–5.1)
PROT SERPL-MCNC: 7.4 G/DL (ref 6–8.4)
RBC # BLD AUTO: 5.68 M/UL (ref 4–5.4)
SATURATED IRON: 18 % (ref 20–50)
SODIUM SERPL-SCNC: 138 MMOL/L (ref 136–145)
TOTAL IRON BINDING CAPACITY: 385 UG/DL (ref 250–450)
TRANSFERRIN SERPL-MCNC: 260 MG/DL (ref 200–375)
TRIGL SERPL-MCNC: 229 MG/DL (ref 30–150)
TSH SERPL DL<=0.005 MIU/L-ACNC: 1.39 UIU/ML (ref 0.4–4)
WBC # BLD AUTO: 6.29 K/UL (ref 3.9–12.7)

## 2022-11-18 PROCEDURE — 1160F RVW MEDS BY RX/DR IN RCRD: CPT | Mod: CPTII,S$GLB,, | Performed by: INTERNAL MEDICINE

## 2022-11-18 PROCEDURE — 85025 COMPLETE CBC W/AUTO DIFF WBC: CPT | Performed by: INTERNAL MEDICINE

## 2022-11-18 PROCEDURE — 99999 PR PBB SHADOW E&M-EST. PATIENT-LVL V: ICD-10-PCS | Mod: PBBFAC,,, | Performed by: INTERNAL MEDICINE

## 2022-11-18 PROCEDURE — 99396 PREV VISIT EST AGE 40-64: CPT | Mod: 25,S$GLB,, | Performed by: INTERNAL MEDICINE

## 2022-11-18 PROCEDURE — 3074F SYST BP LT 130 MM HG: CPT | Mod: CPTII,S$GLB,, | Performed by: INTERNAL MEDICINE

## 2022-11-18 PROCEDURE — 3074F PR MOST RECENT SYSTOLIC BLOOD PRESSURE < 130 MM HG: ICD-10-PCS | Mod: CPTII,S$GLB,, | Performed by: INTERNAL MEDICINE

## 2022-11-18 PROCEDURE — 80053 COMPREHEN METABOLIC PANEL: CPT | Performed by: INTERNAL MEDICINE

## 2022-11-18 PROCEDURE — 1159F MED LIST DOCD IN RCRD: CPT | Mod: CPTII,S$GLB,, | Performed by: INTERNAL MEDICINE

## 2022-11-18 PROCEDURE — 84443 ASSAY THYROID STIM HORMONE: CPT | Performed by: INTERNAL MEDICINE

## 2022-11-18 PROCEDURE — 1159F PR MEDICATION LIST DOCUMENTED IN MEDICAL RECORD: ICD-10-PCS | Mod: CPTII,S$GLB,, | Performed by: INTERNAL MEDICINE

## 2022-11-18 PROCEDURE — 84466 ASSAY OF TRANSFERRIN: CPT | Performed by: INTERNAL MEDICINE

## 2022-11-18 PROCEDURE — 90750 ZOSTER RECOMBINANT VACCINE: ICD-10-PCS | Mod: S$GLB,,, | Performed by: INTERNAL MEDICINE

## 2022-11-18 PROCEDURE — 36415 COLL VENOUS BLD VENIPUNCTURE: CPT | Mod: PO | Performed by: INTERNAL MEDICINE

## 2022-11-18 PROCEDURE — 1160F PR REVIEW ALL MEDS BY PRESCRIBER/CLIN PHARMACIST DOCUMENTED: ICD-10-PCS | Mod: CPTII,S$GLB,, | Performed by: INTERNAL MEDICINE

## 2022-11-18 PROCEDURE — 3008F PR BODY MASS INDEX (BMI) DOCUMENTED: ICD-10-PCS | Mod: CPTII,S$GLB,, | Performed by: INTERNAL MEDICINE

## 2022-11-18 PROCEDURE — 86803 HEPATITIS C AB TEST: CPT | Performed by: INTERNAL MEDICINE

## 2022-11-18 PROCEDURE — 99396 PR PREVENTIVE VISIT,EST,40-64: ICD-10-PCS | Mod: 25,S$GLB,, | Performed by: INTERNAL MEDICINE

## 2022-11-18 PROCEDURE — 3078F PR MOST RECENT DIASTOLIC BLOOD PRESSURE < 80 MM HG: ICD-10-PCS | Mod: CPTII,S$GLB,, | Performed by: INTERNAL MEDICINE

## 2022-11-18 PROCEDURE — 99999 PR PBB SHADOW E&M-EST. PATIENT-LVL V: CPT | Mod: PBBFAC,,, | Performed by: INTERNAL MEDICINE

## 2022-11-18 PROCEDURE — 90471 ZOSTER RECOMBINANT VACCINE: ICD-10-PCS | Mod: S$GLB,,, | Performed by: INTERNAL MEDICINE

## 2022-11-18 PROCEDURE — 3078F DIAST BP <80 MM HG: CPT | Mod: CPTII,S$GLB,, | Performed by: INTERNAL MEDICINE

## 2022-11-18 PROCEDURE — 90471 IMMUNIZATION ADMIN: CPT | Mod: S$GLB,,, | Performed by: INTERNAL MEDICINE

## 2022-11-18 PROCEDURE — 90750 HZV VACC RECOMBINANT IM: CPT | Mod: S$GLB,,, | Performed by: INTERNAL MEDICINE

## 2022-11-18 PROCEDURE — 82728 ASSAY OF FERRITIN: CPT | Performed by: INTERNAL MEDICINE

## 2022-11-18 PROCEDURE — 83036 HEMOGLOBIN GLYCOSYLATED A1C: CPT | Performed by: INTERNAL MEDICINE

## 2022-11-18 PROCEDURE — 80061 LIPID PANEL: CPT | Performed by: INTERNAL MEDICINE

## 2022-11-18 PROCEDURE — 82306 VITAMIN D 25 HYDROXY: CPT | Performed by: INTERNAL MEDICINE

## 2022-11-18 PROCEDURE — 3008F BODY MASS INDEX DOCD: CPT | Mod: CPTII,S$GLB,, | Performed by: INTERNAL MEDICINE

## 2022-11-18 RX ORDER — CIPROFLOXACIN HYDROCHLORIDE 3 MG/ML
SOLUTION/ DROPS OPHTHALMIC
COMMUNITY
Start: 2022-09-02

## 2022-11-18 RX ORDER — KETOROLAC TROMETHAMINE 4 MG/ML
SOLUTION/ DROPS OPHTHALMIC
COMMUNITY
Start: 2022-09-02

## 2022-11-18 NOTE — PROGRESS NOTES
Health Maintenance Due   Topic     Hepatitis C Screening      TETANUS VACCINE      Colorectal Cancer Screening      Shingles Vaccine (1 of 2) hx chickenpox ; inform pt can get vaccine at pharmacy.      Mammogram  Already scheduled    Cervical Cancer Screening

## 2022-11-18 NOTE — PROGRESS NOTES
Subjective:       Patient ID: Herlinda Ge is a pleasant 52 y.o. White female patient    Chief Complaint: Annual Exam      Patient is a pt I saw last on 04/28/2021, see my last notes and the list of problems below.     HPI     She was previous patient from Dr. Lombard.  She has not seen him from our last visit.    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    She reports as main concern a weight gain of 20 lb from the moment she started working remote, first she states that she has a healthy diet, however afterwards that she does not not have such a good one, she eats a lot of gummy bears among others.    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    She started to go back to the gym, but does not do any cardiovascular activity.  She went back 1 month ago, but is not consistent.  She is self aware re: the weight gain and does not like to go out because of this.    Her foot pain is better than when I saw her last time.    She has been placed on progesterone and estrogen by her OBGYN but she stopped everything 1 month ago.  She is going to follow-up with her soon.      Patient Active Problem List   Diagnosis    Menopause    Fatigue    Postcoital bleeding    BMI 28.0-28.9,adult    Foot pain, left    Chronic midline low back pain with bilateral sciatica    Iron deficiency    Vitamin D deficiency          ACTIVE MEDICAL ISSUES:  Documented in Problem List     PAST MEDICAL HISTORY  Documented     PAST SURGICAL HISTORY:  Documented     SOCIAL HISTORY:  Documented     FAMILY HISTORY:  Documented     ALLERGIES AND MEDICATIONS: updated and reviewed.  Documented    Review of Systems   Constitutional:  Positive for activity change and unexpected weight change.   HENT:  Negative for hearing loss and trouble swallowing.    Eyes:  Negative for discharge and visual disturbance.  "  Respiratory:  Negative for chest tightness and wheezing.    Cardiovascular:  Negative for chest pain and palpitations.   Gastrointestinal:  Negative for blood in stool, constipation, diarrhea and vomiting.   Endocrine: Negative for polydipsia and polyuria.   Genitourinary:  Negative for difficulty urinating, dysuria, hematuria and menstrual problem.   Musculoskeletal:  Negative for arthralgias (L foot) and joint swelling.   Neurological:  Negative for weakness and headaches.   Psychiatric/Behavioral:  Positive for dysphoric mood. Negative for confusion.      Objective:      Physical Exam  Vitals and nursing note reviewed.   Constitutional:       Appearance: Normal appearance.   HENT:      Right Ear: Tympanic membrane normal.      Left Ear: Tympanic membrane normal.   Eyes:      Conjunctiva/sclera: Conjunctivae normal.   Cardiovascular:      Rate and Rhythm: Normal rate and regular rhythm.      Pulses: Normal pulses.      Heart sounds: Normal heart sounds.   Abdominal:      General: Bowel sounds are normal.   Musculoskeletal:         General: Normal range of motion.   Skin:     General: Skin is warm and dry.   Neurological:      General: No focal deficit present.      Mental Status: She is alert and oriented to person, place, and time.   Psychiatric:         Mood and Affect: Mood normal.         Behavior: Behavior normal.         Thought Content: Thought content normal.         Judgment: Judgment normal.       Vitals:    11/18/22 0856   BP: 122/79   BP Location: Right arm   Patient Position: Sitting   BP Method: Small (Automatic)   Pulse: 65   Resp: 16   Temp: 97.9 °F (36.6 °C)   TempSrc: Oral   SpO2: 98%   Weight: 88.1 kg (194 lb 3.6 oz)   Height: 5' 9" (1.753 m)     Body mass index is 28.68 kg/m².    RESULTS: Reviewed labs from last 12 months    Last Lab Results:     Lab Results   Component Value Date    WBC 8.51 10/02/2020    HGB 15.7 10/02/2020    HCT 45.7 10/02/2020     10/02/2020     10/02/2020 "    K 4.4 10/02/2020     10/02/2020    CO2 25 10/02/2020    BUN 23 (H) 10/02/2020    CREATININE 0.9 10/02/2020    CALCIUM 9.5 10/02/2020    ALBUMIN 4.3 10/02/2020    AST 22 10/02/2020    ALT 11 10/02/2020    CHOL 249 (H) 10/02/2020    TRIG 70 10/02/2020    HDL 93 (H) 10/02/2020    LDLCALC 142.0 10/02/2020    HGBA1C 4.9 10/02/2020    TSH 1.099 10/02/2020         Assessment:       1. Encounter for annual physical exam    2. BMI 28.0-28.9,adult    3. Foot pain, left    4. Chronic midline low back pain with bilateral sciatica    5. Iron deficiency    6. Vitamin D deficiency    7. Screening for malignant neoplasm of colon    8. Need for shingles vaccine        Plan:   Herlinda was seen today for annual exam.    Diagnoses and all orders for this visit:    Encounter for annual physical exam  -     CBC Auto Differential; Future  -     Comprehensive Metabolic Panel; Future  -     Hemoglobin A1C; Future  -     TSH; Future  -     Lipid Panel; Future  -     Hepatitis C Antibody; Future    Will do usual blood work, discussed and updated preventative measures. Discussed lifestyle.    BMI 28.0-28.9,adult    Pt gained 20 pounds from last visit.  We discussed weight issues and safe, effective ways of losing pounds, includin) diet:  low carbohydrate, low fat diet, stay away from fast food, fried and processed food, use whole grain, lot of fruits and vegetables, use healthy fat such as avocado, nuts and olive oil in reasonable quantity, stay away from sodas. Regular meals with lean proteins.  2) physical activity: ideally 150 min a week, with cardiovascular and resistance activity.  Patient was encouraged to set realistic attainable goals for weight loss, and we will follow up periodically.  Pt's WL goal for next visit: 5-10 pounds.    Foot pain, left    Seems to be a bit better.    Chronic midline low back pain with bilateral sciatica    Not discussed today.    Iron deficiency  -     Iron and TIBC; Future  -     Ferritin;  Future    Will monitor. Pt is supposed to have hysterectomy in 01/2023.    Vitamin D deficiency  -     Vitamin D; Future    Will monitor.    Screening for malignant neoplasm of colon  -     Ambulatory referral/consult to Endo Procedure ; Future    Need for shingles vaccine  -     (In Office Administered) Zoster Recombinant Vaccine    Follow up for f-up.    This note was created by combination of typed  and M-Modal dictation.  Transcription errors may be present.  If there are any questions, please contact me.

## 2022-11-19 ENCOUNTER — PATIENT MESSAGE (OUTPATIENT)
Dept: FAMILY MEDICINE | Facility: CLINIC | Age: 52
End: 2022-11-19
Payer: COMMERCIAL

## 2022-12-01 ENCOUNTER — PATIENT MESSAGE (OUTPATIENT)
Dept: ADMINISTRATIVE | Facility: HOSPITAL | Age: 52
End: 2022-12-01
Payer: COMMERCIAL

## 2022-12-27 ENCOUNTER — PATIENT OUTREACH (OUTPATIENT)
Dept: ADMINISTRATIVE | Facility: HOSPITAL | Age: 52
End: 2022-12-27
Payer: COMMERCIAL

## 2023-01-06 ENCOUNTER — HOSPITAL ENCOUNTER (OUTPATIENT)
Dept: RADIOLOGY | Facility: HOSPITAL | Age: 53
Discharge: HOME | End: 2023-01-06
Attending: FAMILY MEDICINE
Payer: COMMERCIAL

## 2023-01-06 DIAGNOSIS — Z12.31 ENCOUNTER FOR SCREENING MAMMOGRAM FOR MALIGNANT NEOPLASM OF BREAST: ICD-10-CM

## 2023-01-06 PROCEDURE — 77063 BREAST TOMOSYNTHESIS BI: CPT | Mod: 26,,, | Performed by: RADIOLOGY

## 2023-01-06 PROCEDURE — 77067 SCR MAMMO BI INCL CAD: CPT | Mod: TC,PN

## 2023-01-06 PROCEDURE — 77063 MAMMO DIGITAL SCREENING BILAT WITH TOMO: ICD-10-PCS | Mod: 26,,, | Performed by: RADIOLOGY

## 2023-01-06 PROCEDURE — 77067 SCR MAMMO BI INCL CAD: CPT | Mod: 26,,, | Performed by: RADIOLOGY

## 2023-01-06 PROCEDURE — 77067 MAMMO DIGITAL SCREENING BILAT WITH TOMO: ICD-10-PCS | Mod: 26,,, | Performed by: RADIOLOGY

## 2023-01-09 ENCOUNTER — PATIENT MESSAGE (OUTPATIENT)
Dept: ADMINISTRATIVE | Facility: HOSPITAL | Age: 53
End: 2023-01-09
Payer: COMMERCIAL

## 2023-01-13 ENCOUNTER — PATIENT MESSAGE (OUTPATIENT)
Dept: FAMILY MEDICINE | Facility: CLINIC | Age: 53
End: 2023-01-13
Payer: COMMERCIAL

## 2023-01-26 ENCOUNTER — CLINICAL SUPPORT (OUTPATIENT)
Dept: ENDOSCOPY | Facility: HOSPITAL | Age: 53
End: 2023-01-26
Attending: INTERNAL MEDICINE
Payer: COMMERCIAL

## 2023-01-26 VITALS — BODY MASS INDEX: 29.62 KG/M2 | WEIGHT: 200 LBS | HEIGHT: 69 IN

## 2023-01-26 DIAGNOSIS — Z12.11 SCREENING FOR MALIGNANT NEOPLASM OF COLON: ICD-10-CM

## 2023-01-26 RX ORDER — SODIUM, POTASSIUM,MAG SULFATES 17.5-3.13G
SOLUTION, RECONSTITUTED, ORAL ORAL
Qty: 1 KIT | Refills: 0 | Status: ON HOLD | OUTPATIENT
Start: 2023-01-26 | End: 2023-03-24 | Stop reason: HOSPADM

## 2023-03-22 ENCOUNTER — PATIENT MESSAGE (OUTPATIENT)
Dept: ADMINISTRATIVE | Facility: HOSPITAL | Age: 53
End: 2023-03-22
Payer: COMMERCIAL

## 2023-03-22 ENCOUNTER — PATIENT OUTREACH (OUTPATIENT)
Dept: ADMINISTRATIVE | Facility: HOSPITAL | Age: 53
End: 2023-03-22
Payer: COMMERCIAL

## 2023-03-22 ENCOUNTER — TELEPHONE (OUTPATIENT)
Dept: ADMINISTRATIVE | Facility: HOSPITAL | Age: 53
End: 2023-03-22
Payer: COMMERCIAL

## 2023-03-22 ENCOUNTER — TELEPHONE (OUTPATIENT)
Dept: ENDOSCOPY | Facility: HOSPITAL | Age: 53
End: 2023-03-22
Payer: COMMERCIAL

## 2023-03-22 VITALS — BODY MASS INDEX: 29.62 KG/M2 | WEIGHT: 200 LBS | HEIGHT: 69 IN

## 2023-03-22 DIAGNOSIS — Z12.11 SCREENING FOR MALIGNANT NEOPLASM OF COLON: ICD-10-CM

## 2023-03-22 DIAGNOSIS — Z12.11 SCREENING FOR MALIGNANT NEOPLASM OF COLON: Primary | ICD-10-CM

## 2023-03-22 DIAGNOSIS — Z12.4 ENCOUNTER FOR SCREENING FOR CERVICAL CANCER: Primary | ICD-10-CM

## 2023-03-22 RX ORDER — SODIUM, POTASSIUM,MAG SULFATES 17.5-3.13G
1 SOLUTION, RECONSTITUTED, ORAL ORAL DAILY
Qty: 1 KIT | Refills: 0 | Status: ON HOLD | OUTPATIENT
Start: 2023-03-22 | End: 2023-03-24 | Stop reason: HOSPADM

## 2023-03-22 NOTE — TELEPHONE ENCOUNTER
Please see previous [facility] encounter requesting for referral for obgyn.      Re:  Herlinda Anabel Linker       2:28 PM  Can Dr. Chambers suggest an obgyn? Dr. Magdaleno formed her own practice and requires an $1800 hormone club annual fee for me to remain in her care fire [facility].   I waited for her practice to link up with my insurance, then they didnt call when it happened, then the membership..  Im no longer interested in that provider.   Thank you.

## 2023-03-22 NOTE — PROGRESS NOTES
HM and immunizations reviewed. Cscope already scheduled for tomorrow. Due for pap smear. Left message for patient to return call. Sent [employer] message.       Calling for overdue health maintenance above, will need to get recent records of where patient had done, if not done can put in orders to get scheduled. Please forward messages to me.

## 2023-03-22 NOTE — PLAN OF CARE
Endoscopy procedure scheduled on 3/24/23, prep instructions reviewed, Pt verbalized understanding. prep instructions to portal and emailed to   [email or web address].

## 2023-03-22 NOTE — TELEPHONE ENCOUNTER
----- Message from Odette Sneed RN sent at 3/22/2023  2:55 PM CDT -----  Contact: [phone]    ----- Message -----  From: Denis Herrera MA  Sent: 3/22/2023   2:50 PM CDT  To: Annika STEWART Staff    Pt states she was speaking with someone from clinic through the patient portal in regards to changing procedure date. She also received a phone call from [facility] regarding her prep medication as well. Please reach back out to pt at [phone]

## 2023-03-23 ENCOUNTER — TELEPHONE (OUTPATIENT)
Dept: ENDOSCOPY | Facility: HOSPITAL | Age: 53
End: 2023-03-23
Payer: COMMERCIAL

## 2023-03-23 NOTE — TELEPHONE ENCOUNTER
Returned patient's phone call in regards to below message. Patient given number to the pre-services department to find out if colonoscopy is covered by insurance. Patient offered other options for prep. Patient states she will like to find out if colonoscopy is covered and then she will call back if she decides she would like to use a different prep.

## 2023-03-23 NOTE — TELEPHONE ENCOUNTER
----- Message from Odette Sneed RN sent at 3/23/2023  7:28 AM CDT -----  Regarding: FW: Insurance  Contact: pt.[phone]    ----- Message -----  From: Tiffany MIRANDA Route  Sent: 3/22/2023   4:50 PM CDT  To: Annika STEWART Staff  Subject: Insurance                                        Pt. Is calling to verify that her procedure will be covered by her insurance. She is checking because her colonoscopy prep is not covered. Patient Requesting Call Back @ pt.[phone]

## 2023-03-24 ENCOUNTER — ANESTHESIA (OUTPATIENT)
Dept: ENDOSCOPY | Facility: HOSPITAL | Age: 53
End: 2023-03-24
Payer: COMMERCIAL

## 2023-03-24 ENCOUNTER — ANESTHESIA EVENT (OUTPATIENT)
Dept: ENDOSCOPY | Facility: HOSPITAL | Age: 53
End: 2023-03-24
Payer: COMMERCIAL

## 2023-03-24 ENCOUNTER — HOSPITAL ENCOUNTER (OUTPATIENT)
Facility: HOSPITAL | Age: 53
Discharge: HOME | End: 2023-03-24
Attending: INTERNAL MEDICINE | Admitting: INTERNAL MEDICINE
Payer: COMMERCIAL

## 2023-03-24 VITALS
TEMPERATURE: 98 F | RESPIRATION RATE: 14 BRPM | SYSTOLIC BLOOD PRESSURE: 112 MMHG | WEIGHT: 200 LBS | HEIGHT: 68 IN | DIASTOLIC BLOOD PRESSURE: 79 MMHG | OXYGEN SATURATION: 100 % | BODY MASS INDEX: 30.31 KG/M2 | HEART RATE: 53 BPM

## 2023-03-24 DIAGNOSIS — Z12.11 SCREENING FOR COLON CANCER: ICD-10-CM

## 2023-03-24 PROCEDURE — 45385 COLONOSCOPY W/LESION REMOVAL: CPT | Mod: 33,,, | Performed by: INTERNAL MEDICINE

## 2023-03-24 PROCEDURE — 37000008 HC ANESTHESIA 1ST 15 MINUTES: Performed by: INTERNAL MEDICINE

## 2023-03-24 PROCEDURE — 88305 TISSUE EXAM BY PATHOLOGIST: CPT | Performed by: PATHOLOGY

## 2023-03-24 PROCEDURE — E9220 PRA ENDO ANESTHESIA: ICD-10-PCS | Mod: 33,,, | Performed by: NURSE ANESTHETIST, CERTIFIED REGISTERED

## 2023-03-24 PROCEDURE — 88305 TISSUE EXAM BY PATHOLOGIST: CPT | Mod: 26,,, | Performed by: PATHOLOGY

## 2023-03-24 PROCEDURE — 27201089 HC SNARE, DISP (ANY): Performed by: INTERNAL MEDICINE

## 2023-03-24 PROCEDURE — 25000003 PHARM REV CODE 250: Performed by: NURSE ANESTHETIST, CERTIFIED REGISTERED

## 2023-03-24 PROCEDURE — 37000009 HC ANESTHESIA EA ADD 15 MINS: Performed by: INTERNAL MEDICINE

## 2023-03-24 PROCEDURE — 88305 TISSUE EXAM BY PATHOLOGIST: ICD-10-PCS | Mod: 26,,, | Performed by: PATHOLOGY

## 2023-03-24 PROCEDURE — E9220 PRA ENDO ANESTHESIA: HCPCS | Mod: 33,,, | Performed by: NURSE ANESTHETIST, CERTIFIED REGISTERED

## 2023-03-24 PROCEDURE — 45385 PR COLONOSCOPY,REMV LESN,SNARE: ICD-10-PCS | Mod: 33,,, | Performed by: INTERNAL MEDICINE

## 2023-03-24 PROCEDURE — 45385 COLONOSCOPY W/LESION REMOVAL: CPT | Mod: PT | Performed by: INTERNAL MEDICINE

## 2023-03-24 RX ORDER — PROPOFOL 10 MG/ML
INJECTION, EMULSION INTRAVENOUS
Status: DISCONTINUED | OUTPATIENT
Start: 2023-03-24 | End: 2023-03-24

## 2023-03-24 RX ORDER — LIDOCAINE HYDROCHLORIDE 20 MG/ML
INJECTION INTRAVENOUS
Status: DISCONTINUED | OUTPATIENT
Start: 2023-03-24 | End: 2023-03-24

## 2023-03-24 RX ORDER — PROPOFOL 10 MG/ML
INJECTION, EMULSION INTRAVENOUS CONTINUOUS PRN
Status: DISCONTINUED | OUTPATIENT
Start: 2023-03-24 | End: 2023-03-24

## 2023-03-24 RX ADMIN — LIDOCAINE HYDROCHLORIDE 100 MG: 20 INJECTION INTRAVENOUS at 10:03

## 2023-03-24 RX ADMIN — PROPOFOL 110 MG: 10 INJECTION, EMULSION INTRAVENOUS at 10:03

## 2023-03-24 RX ADMIN — PROPOFOL 150 MCG/KG/MIN: 10 INJECTION, EMULSION INTRAVENOUS at 10:03

## 2023-03-24 RX ADMIN — SODIUM CHLORIDE: 0.9 INJECTION, SOLUTION INTRAVENOUS at 09:03

## 2023-03-24 NOTE — PROVATION PATIENT INSTRUCTIONS
Discharge Summary/Instructions after an Endoscopic Procedure  Patient Name: Herlinda Ge  Patient MRN: [id number]  Patient YOB: 1970 Friday, March 24, 2023  Rk Roblero MD  Dear patient,  As a result of recent federal legislation (The Federal Cures Act), you may   receive lab or pathology results from your procedure in your [facility]   account before your physician is able to contact you. Your physician or   their representative will relay the results to you with their   recommendations at their soonest availability.  Thank you,  RESTRICTIONS:  During your procedure today, you received medications for sedation.  These   medications may affect your judgment, balance and coordination.  Therefore,   for 24 hours, you have the following restrictions:   - DO NOT drive a car, operate machinery, make legal/financial decisions,   sign important papers or drink alcohol.    ACTIVITY:  Today: no heavy lifting, straining or running due to procedural   sedation/anesthesia.  The following day: return to full activity including work.  DIET:  Eat and drink normally unless instructed otherwise.     TREATMENT FOR COMMON SIDE EFFECTS:  - Mild abdominal pain, nausea, belching, bloating or excessive gas:  rest,   eat lightly and use a heating pad.  - Sore Throat: treat with throat lozenges and/or gargle with warm salt   water.  - Because air was used during the procedure, expelling large amounts of air   from your rectum or belching is normal.  - If a bowel prep was taken, you may not have a bowel movement for 1-3 days.    This is normal.  SYMPTOMS TO WATCH FOR AND REPORT TO YOUR PHYSICIAN:  1. Abdominal pain or bloating, other than gas cramps.  2. Chest pain.  3. Back pain.  4. Signs of infection such as: chills or fever occurring within 24 hours   after the procedure.  5. Rectal bleeding, which would show as bright red, maroon, or black stools.   (A tablespoon of blood from the rectum is not serious, especially if    hemorrhoids are present.)  6. Vomiting.  7. Weakness or dizziness.  GO DIRECTLY TO THE NEAREST EMERGENCY ROOM IF YOU HAVE ANY OF THE FOLLOWING:      Difficulty breathing              Chills and/or fever over 101 F   Persistent vomiting and/or vomiting blood   Severe abdominal pain   Severe chest pain   Black, tarry stools   Bleeding- more than one tablespoon   Any other symptom or condition that you feel may need urgent attention  Your doctor recommends these additional instructions:  If any biopsies were taken, your doctors clinic will contact you in 1 to 2   weeks with any results.  - Discharge patient to home.   - Resume previous diet today.   - Continue present medications.   - Await pathology results.   - Repeat colonoscopy in 3 years for surveillance.   - Return to referring physician as previously scheduled.   - Patient has a contact number available for emergencies.  The signs and   symptoms of potential delayed complications were discussed with the   patient.  Return to normal activities tomorrow.  Written discharge   instructions were provided to the patient.  For questions, problems or results please call your physician - Rk Roblero MD at Work:  [phone].  [facility] [place], EMERGENCY ROOM PHONE NUMBER: [phone]  IF A COMPLICATION OR EMERGENCY SITUATION ARISES AND YOU ARE UNABLE TO REACH   YOUR PHYSICIAN - GO DIRECTLY TO THE EMERGENCY ROOM.  Rk Roblero MD  3/24/2023 10:33:54 AM  This report has been verified and signed electronically.  Dear patient,  As a result of recent federal legislation (The Federal Cures Act), you may   receive lab or pathology results from your procedure in your [facility]   account before your physician is able to contact you. Your physician or   their representative will relay the results to you with their   recommendations at their soonest availability.  Thank you,  PROVATION

## 2023-03-24 NOTE — ANESTHESIA POSTPROCEDURE EVALUATION
Anesthesia Post Evaluation    Patient: Herlinda Ge    Procedure(s) Performed: Procedure(s) (LRB):  COLONOSCOPY (N/A)    Final Anesthesia Type: general      Patient location during evaluation: GI PACU  Patient participation: Yes- Able to Participate  Level of consciousness: awake and alert  Post-procedure vital signs: reviewed and stable  Pain management: adequate  Airway patency: patent    PONV status at discharge: No PONV  Anesthetic complications: no      Cardiovascular status: blood pressure returned to baseline  Respiratory status: spontaneous ventilation  Hydration status: euvolemic  Follow-up not needed.          Vitals Value Taken Time   /79 03/24/23 1105   Temp 36.4 °C (97.5 °F) 03/24/23 1035   Pulse 53 03/24/23 1105   Resp 14 03/24/23 1105   SpO2 100 % 03/24/23 1105         Event Time   Out of Recovery 11:16:57         Pain/Elie Score: Elie Score: 10 (3/24/2023 11:05 AM)

## 2023-03-24 NOTE — TRANSFER OF CARE
"Anesthesia Transfer of Care Note    Patient: Herlinda Ge    Procedure(s) Performed: Procedure(s) (LRB):  COLONOSCOPY (N/A)    Patient location: GI    Anesthesia Type: general    Transport from OR: Transported from OR on room air with adequate spontaneous ventilation    Post pain: adequate analgesia    Post assessment: no apparent anesthetic complications    Post vital signs: stable    Level of consciousness: sedated    Nausea/Vomiting: no nausea/vomiting    Complications: none    Transfer of care protocol was followed      Last vitals:   Visit Vitals  /69 (BP Location: Left arm, Patient Position: Lying)   Pulse 65   Temp 36.4 °C (97.5 °F) (Temporal)   Resp 14   Ht 5' 8" (1.727 m)   Wt 90.7 kg (200 lb)   LMP  (LMP Unknown)   SpO2 97%   Breastfeeding No   BMI 30.41 kg/m²     "

## 2023-03-24 NOTE — ANESTHESIA PREPROCEDURE EVALUATION
03/24/2023  Herlinda Ge is a 53 y.o., female.  Past Medical History:   Diagnosis Date    ADHD     Fracture, vertebral, lumbar closed     History of anxiety     History of depression     History of eating disorder     Hormone replacement therapy (HRT)     Menopause 2016     Patient Active Problem List   Diagnosis    Menopause    Fatigue    Postcoital bleeding    BMI 28.0-28.9,adult    Foot pain, left    Chronic midline low back pain with bilateral sciatica    Iron deficiency    Vitamin D deficiency     Pre-op Assessment    I have reviewed the NPO Status.      Review of Systems  Anesthesia Hx:   Denies Personal Hx of Anesthesia complications.   Social:  Social Alcohol Use    Endocrine:  Obesity / BMI > 30  Psych:   anxiety depression          Physical Exam    Airway:  Mallampati: III   Neck ROM: Normal ROM        Anesthesia Plan  Type of Anesthesia, risks & benefits discussed:    Anesthesia Type: Gen Natural Airway  Intra-op Monitoring Plan: Standard ASA Monitors  Induction:  IV  Informed Consent: Informed consent signed with the Patient and all parties understand the risks and agree with anesthesia plan.  All questions answered.   ASA Score: 2    Ready For Surgery From Anesthesia Perspective.     .

## 2023-03-24 NOTE — H&P
Short Stay Endoscopy History and Physical    PCP - Ashleigh Chambers MD    Procedure - Colonoscopy  ASA - 1  Mallampati - per anesthesia  History of Anesthesia problems - no  Family history Anesthesia problems -  no     HPI:  This is a 53 y.o. female here for evaluation of :     Average Risk Screening: yes  High risk screening: No  History of polyps: No  Anemia: No  Blood in stools: No  Diarrhea: No  Abdominal Pain: No    Review of Systems:  CONSTITUTIONAL: Denies weight change,  fatigue, fevers, chills, night sweats.  CARDIOVASCULAR: Denies chest pain, shortness of breath, orthopnea and edema.  RESPIRATORY: Denies cough, hemoptysis, dyspnea, and wheezing.  GI: See HPI.    Medical History:  Past Medical History:   Diagnosis Date    ADHD     Fracture, vertebral, lumbar closed     History of anxiety     History of depression     History of eating disorder     Hormone replacement therapy (HRT)     Menopause 2016       Surgical History:   Past Surgical History:   Procedure Laterality Date     SECTION      Boy    KNEE ARTHROSCOPY Left     TENDON REPAIR Left     ankle     TONSILLECTOMY         Family History:   Family History   Problem Relation Age of Onset    Heart attack Father     Breast cancer Mother 71    Colon cancer Neg Hx     Ovarian cancer Neg Hx     Diabetes Neg Hx     Hypertension Neg Hx     Stroke Neg Hx        Social History:   Social History     Tobacco Use    Smoking status: Never    Smokeless tobacco: Never   Substance Use Topics    Alcohol use: Yes     Alcohol/week: 0.0 standard drinks     Comment: rare    Drug use: No       Allergies: Reviewed.    Medications:  No current facility-administered medications on file prior to encounter.     Current Outpatient Medications on File Prior to Encounter   Medication Sig Dispense Refill    dextroamphetamine-amphetamine 10 mg Tab Take 1 tablet by mouth 2 (two) times daily.      ciprofloxacin HCl (CILOXAN) 0.3 % ophthalmic solution INSTILL 2 DROPS IN  RIGHT EYE EVERY 6 HOURS FOR 5 DAYS      ketorolac 0.4% (ACULAR) 0.4 % Drop       naproxen (NAPROSYN) 500 MG tablet To take PRN, smaller amount possible, on a full stomach only 60 tablet 2    sodium,potassium,mag sulfates (SUPREP BOWEL PREP KIT) 17.5-3.13-1.6 gram SolR Follow instructions given by Endoscopy scheduling nurse 1 kit 0       Physical Exam:  Vital Signs:   Vitals:    03/24/23 0920   BP: (!) 156/87   Pulse: 73   Resp: 16   Temp: 98.1 °F (36.7 °C)     General Appearance: Well appearing in no acute distress  ENT: OP clear  Chest: CTA B  CV: RRR, no m/r/g  Abd: s/nt/nd/nabs  Ext: no edema    Labs:  Reviewed    IMPRESSION:    Screening    Plan:  I have explained the risks and benefits of colonoscopy to the patient including but not limited to bleeding, perforation, infection, and death. The patient wishes to proceed with colonoscopy.

## 2023-03-30 LAB
FINAL PATHOLOGIC DIAGNOSIS: NORMAL
GROSS: NORMAL
Lab: NORMAL

## 2023-04-12 ENCOUNTER — PATIENT MESSAGE (OUTPATIENT)
Dept: ADMINISTRATIVE | Facility: HOSPITAL | Age: 53
End: 2023-04-12
Payer: COMMERCIAL

## 2023-08-15 ENCOUNTER — PATIENT MESSAGE (OUTPATIENT)
Dept: ADMINISTRATIVE | Facility: HOSPITAL | Age: 53
End: 2023-08-15
Payer: COMMERCIAL

## 2023-08-15 ENCOUNTER — PATIENT OUTREACH (OUTPATIENT)
Dept: ADMINISTRATIVE | Facility: HOSPITAL | Age: 53
End: 2023-08-15
Payer: COMMERCIAL

## 2023-08-15 NOTE — PROGRESS NOTES
Population Health Chart Review & Patient Outreach Details:     Reason for Outreach Encounter:     [x]  Non-Compliant Report   []  Payor Report (Humana, PHN, BCBS, MSSP, MCIP, UHC, etc.)   []  Pre-Visit Chart Review     Updates Requested / Reviewed:     [x]  Care Everywhere    []     []  External Sources ([facility], [facility], DIS, etc.)   [x]  Care Team Updated    Patient Outreach Method:    [x]  Telephone Outreach Completed   [] Successful   [x] Left Voicemail   [] Unable to Contact (wrong number, no voicemail)  [x]  [facility] Portal Outreach Sent  []  Letter Outreach Mailed  []  Fax Sent for External Records  []  External Records Upload    Health Maintenance Topics Addressed and Outreach Outcomes / Actions Taken:        []      Breast Cancer Screening []  Mammo Scheduled      []  External Records Requested     []  Added Reminder to Complete to Upcoming Primary Care Appt Notes     []  Patient Declined     []  Patient Will Call Back to Schedule     []  Patient Will Schedule with External Provider / Order Routed if Applicable             [x]       Cervical Cancer Screening []  Pap Scheduled      []  External Records Requested     []  Added Reminder to Complete to Upcoming Primary Care Appt Notes     []  Patient Declined     []  Patient Will Call Back to Schedule     []  Patient Will Schedule with External Provider               []          Colorectal Cancer Screening []  Colonoscopy Case Request or Referral Placed     []  External Records Requested     []  Added Reminder to Complete to Upcoming Primary Care Appt Notes     []  Patient Declined     []  Patient Will Call Back to Schedule     []  Patient Will Schedule with External Provider     []  Fit Kit Mailed (add the SmartPhrase under additional notes)     []  Reminded Patient to Complete Home Test             []      Diabetic Eye Exam []  Eye Camera Scheduled or Optometry Referral Placed     []  External Records Requested     []  Added Reminder to Complete  to Upcoming Primary Care Appt Notes     []  Patient Declined     []  Patient Will Call Back to Schedule     []  Patient Will Schedule with External Provider             []      Blood Pressure Control []  Primary Care Follow Up Visit Scheduled     []  Remote Blood Pressure Reading Captured     []  Added Reminder to Complete to Upcoming Primary Care Appt Notes     []  Patient Declined     []  Patient Will Call Back / Patient Will Send Portal Message with Reading     []  Patient Will Call Back to Schedule Provider Visit             []       HbA1c & Other Labs []  Lab Appt Scheduled for Due Labs     []  Primary Care Follow Up Visit Scheduled      []  Reminded Patient to Complete Home Test     []  Added Reminder to Complete to Upcoming Primary Care Appt Notes     []  Patient Declined     []  Patient Will Call Back to Schedule     []  Patient Will Schedule with External Provider / Order Routed if Applicable           []    Schedule Primary Care Appt []  Primary Care Appt Scheduled     []  Patient Declined     []  Patient Will Call Back to Schedule     []  Pt Established with External Provider & Updated Care Team             []      Medication Adherence []  Primary Care Appointment Scheduled     []  Added Reminder to Upcoming Primary Care Appt Notes     []  Patient Reminded to  Prescription     []  Patient Declined, Provider Notified if Needed     []  Sent Provider Message to Review and/or Add Exclusion to Problem List             []      Osteoporosis Screening []  DXA Appointment Scheduled     []  External Records Requested     []  Added Reminder to Complete to Upcoming Primary Care Appt Notes     []  Patient Declined     []  Patient Will Call Back to Schedule     []  Patient Will Schedule with External Provider / Order Routed if Applicable     Additional Care Coordinator Notes:         Further Action Needed If Patient Returns Outreach:     Schedule pap smear.

## 2023-08-22 ENCOUNTER — PATIENT OUTREACH (OUTPATIENT)
Dept: ADMINISTRATIVE | Facility: HOSPITAL | Age: 53
End: 2023-08-22
Payer: COMMERCIAL

## 2023-08-22 NOTE — PROGRESS NOTES
Population Health Chart Review & Patient Outreach Details:     Reason for Outreach Encounter:     [x]  Non-Compliant Report   []  Payor Report (Humana, PHN, BCBS, MSSP, MCIP, UHC, etc.)   []  Pre-Visit Chart Review     Updates Requested / Reviewed:     [x]  Care Everywhere    []     []  External Sources ([facility], [facility], DIS, etc.)   []  Care Team Updated    Patient Outreach Method:    [x]  Telephone Outreach Completed   [] Successful   [x] Left Voicemail   [] Unable to Contact (wrong number, no voicemail)  [x]  [facility] Portal Outreach Sent  []  Letter Outreach Mailed  []  Fax Sent for External Records  []  External Records Upload    Health Maintenance Topics Addressed and Outreach Outcomes / Actions Taken:        []      Breast Cancer Screening []  Mammo Scheduled      []  External Records Requested     []  Added Reminder to Complete to Upcoming Primary Care Appt Notes     []  Patient Declined     []  Patient Will Call Back to Schedule     []  Patient Will Schedule with External Provider / Order Routed if Applicable             [x]       Cervical Cancer Screening []  Pap Scheduled      []  External Records Requested     []  Added Reminder to Complete to Upcoming Primary Care Appt Notes     []  Patient Declined     []  Patient Will Call Back to Schedule     []  Patient Will Schedule with External Provider               []          Colorectal Cancer Screening []  Colonoscopy Case Request or Referral Placed     []  External Records Requested     []  Added Reminder to Complete to Upcoming Primary Care Appt Notes     []  Patient Declined     []  Patient Will Call Back to Schedule     []  Patient Will Schedule with External Provider     []  Fit Kit Mailed (add the SmartPhrase under additional notes)     []  Reminded Patient to Complete Home Test             []      Diabetic Eye Exam []  Eye Camera Scheduled or Optometry Referral Placed     []  External Records Requested     []  Added Reminder to Complete  to Upcoming Primary Care Appt Notes     []  Patient Declined     []  Patient Will Call Back to Schedule     []  Patient Will Schedule with External Provider             []      Blood Pressure Control []  Primary Care Follow Up Visit Scheduled     []  Remote Blood Pressure Reading Captured     []  Added Reminder to Complete to Upcoming Primary Care Appt Notes     []  Patient Declined     []  Patient Will Call Back / Patient Will Send Portal Message with Reading     []  Patient Will Call Back to Schedule Provider Visit             []       HbA1c & Other Labs []  Lab Appt Scheduled for Due Labs     []  Primary Care Follow Up Visit Scheduled      []  Reminded Patient to Complete Home Test     []  Added Reminder to Complete to Upcoming Primary Care Appt Notes     []  Patient Declined     []  Patient Will Call Back to Schedule     []  Patient Will Schedule with External Provider / Order Routed if Applicable           []    Schedule Primary Care Appt []  Primary Care Appt Scheduled     []  Patient Declined     []  Patient Will Call Back to Schedule     []  Pt Established with External Provider & Updated Care Team             []      Medication Adherence []  Primary Care Appointment Scheduled     []  Added Reminder to Upcoming Primary Care Appt Notes     []  Patient Reminded to  Prescription     []  Patient Declined, Provider Notified if Needed     []  Sent Provider Message to Review and/or Add Exclusion to Problem List             []      Osteoporosis Screening []  DXA Appointment Scheduled     []  External Records Requested     []  Added Reminder to Complete to Upcoming Primary Care Appt Notes     []  Patient Declined     []  Patient Will Call Back to Schedule     []  Patient Will Schedule with External Provider / Order Routed if Applicable     Additional Care Coordinator Notes:         Further Action Needed If Patient Returns Outreach:     Schedule pap smear

## 2023-11-13 ENCOUNTER — PATIENT MESSAGE (OUTPATIENT)
Dept: ADMINISTRATIVE | Facility: HOSPITAL | Age: 53
End: 2023-11-13
Payer: COMMERCIAL

## 2023-11-15 ENCOUNTER — PATIENT OUTREACH (OUTPATIENT)
Dept: ADMINISTRATIVE | Facility: HOSPITAL | Age: 53
End: 2023-11-15
Payer: COMMERCIAL

## 2023-11-15 DIAGNOSIS — Z12.31 ENCOUNTER FOR SCREENING MAMMOGRAM FOR MALIGNANT NEOPLASM OF BREAST: Primary | ICD-10-CM

## 2023-11-15 NOTE — PROGRESS NOTES
Population Health Chart Review & Patient Outreach Details      Further Action Needed If Patient Returns Outreach:      Schedule mammogram after 2024 and pap smear.      Updates Requested / Reviewed:     [x]  Care Everywhere    []     []  External Sources ([facility], [facility], DIS, etc.)    [] [facility]   [] [facility]   [] Other:    []  Care Team Updated   []  Removed  or Duplicate Orders   []  Immunization Reconciliation Completed / Queried    [] [place]   [] [place]   [] [place]   [] [place]      Health Maintenance Topics Addressed and Outreach Outcomes / Actions Taken:             Breast Cancer Screening []  Mammogram Order Placed    []  Mammogram Screening Scheduled    []  External Records Requested & Care Team Updated if Applicable    []  External Records Uploaded & Care Team Updated if Applicable    []  Pt Declined Scheduling Mammogram    []  Pt Will Schedule with External Provider / Order Routed & Care Team Updated if Applicable              Cervical Cancer Screening []  Pap Smear Scheduled in Primary Care or OBGYN    []  External Records Requested & Care Team Updated if Applicable       []  External Records Uploaded, Care Team Updated, & History Updated if Applicable    []  Patient Declined Scheduling Pap Smear    []  Patient Will Schedule with External Provider & Care Team Updated if Applicable                  Colorectal Cancer Screening []  Colonoscopy Case Request / Referral / Home Test Order Placed    []  External Records Requested & Care Team Updated if Applicable    []  External Records Uploaded, Care Team Updated, & History Updated if Applicable    []  Patient Declined Completing Colon Cancer Screening    []  Patient Will Schedule with External Provider & Care Team Updated if Applicable    []  Fit Kit Mailed (add the SmartPhrase under additional notes)    []  Reminded Patient to Complete Home Test                Diabetic Eye Exam []  Eye Exam Screening Order Placed    []  Eye Camera  Scheduled or Optometry/Ophthalmology Referral Placed    []  External Records Requested & Care Team Updated if Applicable    []  External Records Uploaded, Care Team Updated, & History Updated if Applicable    []  Patient Declined Scheduling Eye Exam    []  Patient Will Schedule with External Provider & Care Team Updated if Applicable             Blood Pressure Control []  Primary Care Follow Up Visit Scheduled     []  Remote Blood Pressure Reading Captured    []  Patient Declined Remote Reading or Scheduling Appt - Escalated to PCP    []  Patient Will Call Back or Send Portal Message with Reading                 HbA1c & Other Labs []  Overdue Lab(s) Ordered    []  Overdue Lab(s) Scheduled    []  External Records Uploaded & Care Team Updated if Applicable    []  Primary Care Follow Up Visit Scheduled     []  Reminded Patient to Complete A1c Home Test    []  Patient Declined Scheduling Labs or Will Call Back to Schedule    []  Patient Will Schedule with External Provider / Order Routed, & Care Team Updated if Applicable           Primary Care Appointment []  Primary Care Appt Scheduled    []  Patient Declined Scheduling or Will Call Back to Schedule    []  Pt Established with External Provider, Updated Care Team, & Informed Pt to Notify Payor if Applicable           Medication Adherence /    Statin Use []  Primary Care Appointment Scheduled    []  Patient Reminded to  Prescription    []  Patient Declined, Provider Notified if Needed    []  Sent Provider Message to Review to Evaluate Pt for Statin, Add Exclusion Dx Codes, Document   Exclusion in Problem List, Change Statin Intensity Level to Moderate or High Intensity if Applicable                Osteoporosis Screening []  Dexa Order Placed    []  Dexa Appointment Scheduled    []  External Records Requested & Care Team Updated    []  External Records Uploaded, Care Team Updated, & History Updated if Applicable    []  Patient Declined Scheduling Dexa or Will Call  Back to Schedule    []  Patient Will Schedule with External Provider / Order Routed & Care Team Updated if Applicable       Additional Notes:     Left message for patient to return call. [employer] patient. Need to schedule mammogram and pap smear. Sent portal message..

## 2023-12-29 ENCOUNTER — TELEPHONE (OUTPATIENT)
Dept: FAMILY MEDICINE | Facility: CLINIC | Age: 53
End: 2023-12-29

## 2024-01-04 ENCOUNTER — OFFICE VISIT (OUTPATIENT)
Dept: FAMILY MEDICINE | Facility: CLINIC | Age: 54
End: 2024-01-04
Payer: COMMERCIAL

## 2024-01-04 ENCOUNTER — LAB VISIT (OUTPATIENT)
Dept: LAB | Facility: HOSPITAL | Age: 54
End: 2024-01-04
Attending: INTERNAL MEDICINE
Payer: COMMERCIAL

## 2024-01-04 VITALS
SYSTOLIC BLOOD PRESSURE: 136 MMHG | HEART RATE: 67 BPM | OXYGEN SATURATION: 98 % | BODY MASS INDEX: 30.27 KG/M2 | DIASTOLIC BLOOD PRESSURE: 74 MMHG | TEMPERATURE: 97 F | WEIGHT: 199.75 LBS | RESPIRATION RATE: 16 BRPM | HEIGHT: 68 IN

## 2024-01-04 DIAGNOSIS — E66.1 CLASS 1 DRUG-INDUCED OBESITY WITHOUT SERIOUS COMORBIDITY WITH BODY MASS INDEX (BMI) OF 30.0 TO 30.9 IN ADULT: ICD-10-CM

## 2024-01-04 DIAGNOSIS — Z23 NEED FOR INFLUENZA VACCINATION: ICD-10-CM

## 2024-01-04 DIAGNOSIS — E55.9 VITAMIN D DEFICIENCY: ICD-10-CM

## 2024-01-04 DIAGNOSIS — E61.1 IRON DEFICIENCY: ICD-10-CM

## 2024-01-04 DIAGNOSIS — Z00.00 ENCOUNTER FOR ANNUAL PHYSICAL EXAM: Primary | Chronic | ICD-10-CM

## 2024-01-04 DIAGNOSIS — Z00.00 ENCOUNTER FOR ANNUAL PHYSICAL EXAM: ICD-10-CM

## 2024-01-04 LAB
25(OH)D3+25(OH)D2 SERPL-MCNC: 49 NG/ML (ref 30–96)
ALBUMIN SERPL BCP-MCNC: 4 G/DL (ref 3.5–5.2)
ALP SERPL-CCNC: 91 U/L (ref 55–135)
ALT SERPL W/O P-5'-P-CCNC: 7 U/L (ref 10–44)
ANION GAP SERPL CALC-SCNC: 12 MMOL/L (ref 8–16)
AST SERPL-CCNC: 16 U/L (ref 10–40)
BASOPHILS # BLD AUTO: 0.04 K/UL (ref 0–0.2)
BASOPHILS NFR BLD: 0.7 % (ref 0–1.9)
BILIRUB SERPL-MCNC: 0.6 MG/DL (ref 0.1–1)
BUN SERPL-MCNC: 22 MG/DL (ref 6–20)
CALCIUM SERPL-MCNC: 9.6 MG/DL (ref 8.7–10.5)
CHLORIDE SERPL-SCNC: 105 MMOL/L (ref 95–110)
CHOLEST SERPL-MCNC: 279 MG/DL (ref 120–199)
CHOLEST/HDLC SERPL: 5.4 {RATIO} (ref 2–5)
CO2 SERPL-SCNC: 24 MMOL/L (ref 23–29)
CREAT SERPL-MCNC: 1 MG/DL (ref 0.5–1.4)
DIFFERENTIAL METHOD BLD: NORMAL
EOSINOPHIL # BLD AUTO: 0.3 K/UL (ref 0–0.5)
EOSINOPHIL NFR BLD: 4.8 % (ref 0–8)
ERYTHROCYTE [DISTWIDTH] IN BLOOD BY AUTOMATED COUNT: 12.3 % (ref 11.5–14.5)
EST. GFR  (NO RACE VARIABLE): >60 ML/MIN/1.73 M^2
ESTIMATED AVG GLUCOSE: 97 MG/DL (ref 68–131)
FERRITIN SERPL-MCNC: 188 NG/ML (ref 20–300)
GLUCOSE SERPL-MCNC: 61 MG/DL (ref 70–110)
HBA1C MFR BLD: 5 % (ref 4–5.6)
HCT VFR BLD AUTO: 43.2 % (ref 37–48.5)
HDLC SERPL-MCNC: 52 MG/DL (ref 40–75)
HDLC SERPL: 18.6 % (ref 20–50)
HGB BLD-MCNC: 15 G/DL (ref 12–16)
IMM GRANULOCYTES # BLD AUTO: 0.02 K/UL (ref 0–0.04)
IMM GRANULOCYTES NFR BLD AUTO: 0.4 % (ref 0–0.5)
IRON SERPL-MCNC: 111 UG/DL (ref 30–160)
LDLC SERPL CALC-MCNC: 200.2 MG/DL (ref 63–159)
LYMPHOCYTES # BLD AUTO: 1.6 K/UL (ref 1–4.8)
LYMPHOCYTES NFR BLD: 28.9 % (ref 18–48)
MCH RBC QN AUTO: 29.7 PG (ref 27–31)
MCHC RBC AUTO-ENTMCNC: 34.7 G/DL (ref 32–36)
MCV RBC AUTO: 86 FL (ref 82–98)
MONOCYTES # BLD AUTO: 0.4 K/UL (ref 0.3–1)
MONOCYTES NFR BLD: 7.5 % (ref 4–15)
NEUTROPHILS # BLD AUTO: 3.2 K/UL (ref 1.8–7.7)
NEUTROPHILS NFR BLD: 57.7 % (ref 38–73)
NONHDLC SERPL-MCNC: 227 MG/DL
NRBC BLD-RTO: 0 /100 WBC
PLATELET # BLD AUTO: 293 K/UL (ref 150–450)
PMV BLD AUTO: 10.6 FL (ref 9.2–12.9)
POTASSIUM SERPL-SCNC: 4.7 MMOL/L (ref 3.5–5.1)
PROT SERPL-MCNC: 7.1 G/DL (ref 6–8.4)
RBC # BLD AUTO: 5.05 M/UL (ref 4–5.4)
SATURATED IRON: 31 % (ref 20–50)
SODIUM SERPL-SCNC: 141 MMOL/L (ref 136–145)
TOTAL IRON BINDING CAPACITY: 358 UG/DL (ref 250–450)
TRANSFERRIN SERPL-MCNC: 242 MG/DL (ref 200–375)
TRIGL SERPL-MCNC: 134 MG/DL (ref 30–150)
TSH SERPL DL<=0.005 MIU/L-ACNC: 1.47 UIU/ML (ref 0.4–4)
WBC # BLD AUTO: 5.57 K/UL (ref 3.9–12.7)

## 2024-01-04 PROCEDURE — 1160F RVW MEDS BY RX/DR IN RCRD: CPT | Mod: CPTII,S$GLB,, | Performed by: INTERNAL MEDICINE

## 2024-01-04 PROCEDURE — 80061 LIPID PANEL: CPT | Performed by: INTERNAL MEDICINE

## 2024-01-04 PROCEDURE — 99396 PREV VISIT EST AGE 40-64: CPT | Mod: 25,S$GLB,, | Performed by: INTERNAL MEDICINE

## 2024-01-04 PROCEDURE — 83540 ASSAY OF IRON: CPT | Performed by: INTERNAL MEDICINE

## 2024-01-04 PROCEDURE — 83036 HEMOGLOBIN GLYCOSYLATED A1C: CPT | Performed by: INTERNAL MEDICINE

## 2024-01-04 PROCEDURE — 80053 COMPREHEN METABOLIC PANEL: CPT | Performed by: INTERNAL MEDICINE

## 2024-01-04 PROCEDURE — 3044F HG A1C LEVEL LT 7.0%: CPT | Mod: CPTII,S$GLB,, | Performed by: INTERNAL MEDICINE

## 2024-01-04 PROCEDURE — 82306 VITAMIN D 25 HYDROXY: CPT | Performed by: INTERNAL MEDICINE

## 2024-01-04 PROCEDURE — 85025 COMPLETE CBC W/AUTO DIFF WBC: CPT | Performed by: INTERNAL MEDICINE

## 2024-01-04 PROCEDURE — 90471 IMMUNIZATION ADMIN: CPT | Mod: S$GLB,,, | Performed by: INTERNAL MEDICINE

## 2024-01-04 PROCEDURE — 90686 IIV4 VACC NO PRSV 0.5 ML IM: CPT | Mod: S$GLB,,, | Performed by: INTERNAL MEDICINE

## 2024-01-04 PROCEDURE — 84443 ASSAY THYROID STIM HORMONE: CPT | Performed by: INTERNAL MEDICINE

## 2024-01-04 PROCEDURE — 99999 PR PBB SHADOW E&M-EST. PATIENT-LVL IV: CPT | Mod: PBBFAC,,, | Performed by: INTERNAL MEDICINE

## 2024-01-04 PROCEDURE — 36415 COLL VENOUS BLD VENIPUNCTURE: CPT | Mod: PO | Performed by: INTERNAL MEDICINE

## 2024-01-04 PROCEDURE — 3078F DIAST BP <80 MM HG: CPT | Mod: CPTII,S$GLB,, | Performed by: INTERNAL MEDICINE

## 2024-01-04 PROCEDURE — 3075F SYST BP GE 130 - 139MM HG: CPT | Mod: CPTII,S$GLB,, | Performed by: INTERNAL MEDICINE

## 2024-01-04 PROCEDURE — 1159F MED LIST DOCD IN RCRD: CPT | Mod: CPTII,S$GLB,, | Performed by: INTERNAL MEDICINE

## 2024-01-04 PROCEDURE — 82728 ASSAY OF FERRITIN: CPT | Performed by: INTERNAL MEDICINE

## 2024-01-04 PROCEDURE — 3008F BODY MASS INDEX DOCD: CPT | Mod: CPTII,S$GLB,, | Performed by: INTERNAL MEDICINE

## 2024-01-04 NOTE — PROGRESS NOTES
Subjective:       Patient ID: Herlinda Ge is a pleasant 53 y.o. White female patient    Chief Complaint: Annual Exam      Patient is a pt I saw last on 11/18/2022, see my last notes.    HPI     Pt with PMH as per list of problems below coming today for her annual physical exam.  She reports having a healthy lifestyle, she has a good diet, she tries to exercise, she takes probiotics.    She tries to fast from 4:00 p.m. to the next morning.    She does not like meat too much but eats it for protein and as her BF likes it.    She has symptoms of menopause but the does not take hormones anymore due to cost.    Patient Active Problem List   Diagnosis    Menopause    Fatigue    Postcoital bleeding    BMI 28.0-28.9,adult    Foot pain, left    Chronic midline low back pain with bilateral sciatica    Iron deficiency    Vitamin D deficiency          ACTIVE MEDICAL ISSUES:  Documented in Problem List     PAST MEDICAL HISTORY  Documented     PAST SURGICAL HISTORY:  Documented     SOCIAL HISTORY:  Documented     FAMILY HISTORY:  Documented     ALLERGIES AND MEDICATIONS: updated and reviewed.  Documented    Review of Systems   Constitutional: Negative.    HENT: Negative.     Respiratory: Negative.     Cardiovascular: Negative.    Gastrointestinal: Negative.    Musculoskeletal: Negative.    Neurological: Negative.    Psychiatric/Behavioral: Negative.     All other systems reviewed and are negative.      Objective:      Physical Exam  Vitals and nursing note reviewed.   Constitutional:       Appearance: Normal appearance.   HENT:      Right Ear: Tympanic membrane and external ear normal.      Left Ear: Tympanic membrane normal.   Eyes:      Conjunctiva/sclera: Conjunctivae normal.   Cardiovascular:      Rate and Rhythm: Normal rate and regular rhythm.      Pulses: Normal pulses.      Heart sounds: Normal heart sounds.   Pulmonary:      Effort: Pulmonary effort is normal.      Breath sounds: Normal breath sounds.  "  Abdominal:      General: Bowel sounds are normal.   Musculoskeletal:         General: Normal range of motion.   Skin:     General: Skin is warm and dry.   Neurological:      General: No focal deficit present.      Mental Status: She is alert and oriented to person, place, and time.   Psychiatric:         Mood and Affect: Mood normal.         Behavior: Behavior normal.         Thought Content: Thought content normal.         Judgment: Judgment normal.         Vitals:    01/04/24 1106   BP: 136/74   BP Location: Left arm   Patient Position: Sitting   BP Method: Large (Manual)   Pulse: 67   Resp: 16   Temp: 97.3 °F (36.3 °C)   TempSrc: Oral   SpO2: 98%   Weight: 90.6 kg (199 lb 11.8 oz)   Height: 5' 8" (1.727 m)     Body mass index is 30.37 kg/m².    RESULTS: Reviewed labs from last 12 months    Last Lab Results:     Lab Results   Component Value Date    WBC 5.57 01/04/2024    HGB 15.0 01/04/2024    HCT 43.2 01/04/2024     01/04/2024     01/04/2024    K 4.7 01/04/2024     01/04/2024    CO2 24 01/04/2024    BUN 22 (H) 01/04/2024    CREATININE 1.0 01/04/2024    CALCIUM 9.6 01/04/2024    ALBUMIN 4.0 01/04/2024    AST 16 01/04/2024    ALT 7 (L) 01/04/2024    CHOL 279 (H) 01/04/2024    TRIG 134 01/04/2024    HDL 52 01/04/2024    LDLCALC 200.2 (H) 01/04/2024    HGBA1C 5.0 01/04/2024    TSH 1.465 01/04/2024       Assessment:       1. Encounter for annual physical exam    2. 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 Iron deficiency    4. Vitamin D deficiency    5. Need for influenza vaccination        Plan:   Herlinda was seen today for annual exam.    Diagnoses and all orders for this visit:    Encounter for annual physical exam  -     CBC Auto Differential; Future  -     Comprehensive Metabolic Panel; Future  -     Hemoglobin A1C; Future  -     TSH; Future  -     Lipid Panel; Future    -     Ambulatory referral/consult to Obstetrics / " Gynecology; Future    Will do usual blood work, discussed and updated preventative measures, discussed lifestyle. Referral to Ob-Gyn to reassess treatment options for symptomatic menopause.    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 Regular meals with lean proteins.  2) physical activity: ideally 150 min a week, with cardiovascular and resistance activity.  Patient was encouraged to set realistic attainable goals for weight loss, and we will follow up periodically.    Iron deficiency  -     Iron and TIBC; Future  -     Ferritin; Future    Monitoring.    Vitamin D deficiency  -     Vitamin D; Future    Need for influenza vaccination  -     Influenza - Quadrivalent *Preferred* (6 months+) (PF)      No follow-ups on file.    This note was created by combination of typed  and M-Modal dictation.  Transcription errors may be present.  If there are any questions, please contact me.

## 2024-01-05 ENCOUNTER — PATIENT MESSAGE (OUTPATIENT)
Dept: FAMILY MEDICINE | Facility: CLINIC | Age: 54
End: 2024-01-05
Payer: COMMERCIAL

## 2024-01-08 ENCOUNTER — HOSPITAL ENCOUNTER (OUTPATIENT)
Dept: RADIOLOGY | Facility: HOSPITAL | Age: 54
Discharge: HOME | End: 2024-01-08
Attending: INTERNAL MEDICINE
Payer: COMMERCIAL

## 2024-01-08 DIAGNOSIS — Z12.31 ENCOUNTER FOR SCREENING MAMMOGRAM FOR MALIGNANT NEOPLASM OF BREAST: ICD-10-CM

## 2024-01-08 PROCEDURE — 77063 BREAST TOMOSYNTHESIS BI: CPT | Mod: 26,,, | Performed by: RADIOLOGY

## 2024-01-08 PROCEDURE — 77067 SCR MAMMO BI INCL CAD: CPT | Mod: 26,,, | Performed by: RADIOLOGY

## 2024-01-08 PROCEDURE — 77067 SCR MAMMO BI INCL CAD: CPT | Mod: TC

## 2024-01-12 ENCOUNTER — TELEPHONE (OUTPATIENT)
Dept: FAMILY MEDICINE | Facility: CLINIC | Age: 54
End: 2024-01-12
Payer: COMMERCIAL

## 2024-01-12 DIAGNOSIS — E78.00 ELEVATED LDL CHOLESTEROL LEVEL: Primary | ICD-10-CM

## 2024-01-12 RX ORDER — ATORVASTATIN CALCIUM 10 MG/1
10 TABLET, FILM COATED ORAL DAILY
Qty: 90 TABLET | Refills: 0 | Status: SHIPPED | OUTPATIENT
Start: 2024-01-12 | End: 2024-01-21 | Stop reason: SDUPTHER

## 2024-01-21 ENCOUNTER — PATIENT MESSAGE (OUTPATIENT)
Dept: FAMILY MEDICINE | Facility: CLINIC | Age: 54
End: 2024-01-21
Payer: COMMERCIAL

## 2024-01-21 DIAGNOSIS — E78.00 ELEVATED LDL CHOLESTEROL LEVEL: ICD-10-CM

## 2024-01-22 RX ORDER — ATORVASTATIN CALCIUM 10 MG/1
10 TABLET, FILM COATED ORAL DAILY
Qty: 90 TABLET | Refills: 0 | Status: SHIPPED | OUTPATIENT
Start: 2024-01-22 | End: 2024-04-01 | Stop reason: SDUPTHER

## 2024-01-22 RX ORDER — ATORVASTATIN CALCIUM 10 MG/1
10 TABLET, FILM COATED ORAL DAILY
Qty: 90 TABLET | Refills: 0 | Status: SHIPPED | OUTPATIENT
Start: 2024-01-22 | End: 2024-01-22 | Stop reason: SDUPTHER

## 2024-01-22 NOTE — TELEPHONE ENCOUNTER
No care due was identified.  Health [facility] Embedded Care Due Messages. Reference number: [id number].   1/21/2024 11:05:43 PM CST

## 2024-01-22 NOTE — TELEPHONE ENCOUNTER
No care due was identified.  Health [facility] Embedded Care Due Messages. Reference number: [id number].   1/22/2024 3:57:39 PM CST

## 2024-01-22 NOTE — TELEPHONE ENCOUNTER
Refill Routing Note   Medication(s) are not appropriate for processing by [facility] for the following reason(s):        New or recently adjusted medication    [facility] action(s):  Defer               Appointments  past 12m or future 3m with PCP    Date Provider   Last Visit   1/4/2024 Ashleigh Chambers MD   Next Visit   Visit date not found Ashleigh Chambers MD   ED visits in past 90 days: 0        Note composed:12:35 PM 01/22/2024

## 2024-02-14 ENCOUNTER — PATIENT MESSAGE (OUTPATIENT)
Dept: FAMILY MEDICINE | Facility: CLINIC | Age: 54
End: 2024-02-14
Payer: COMMERCIAL

## 2024-03-12 ENCOUNTER — PATIENT MESSAGE (OUTPATIENT)
Dept: FAMILY MEDICINE | Facility: CLINIC | Age: 54
End: 2024-03-12
Payer: COMMERCIAL

## 2024-03-29 ENCOUNTER — PATIENT MESSAGE (OUTPATIENT)
Dept: FAMILY MEDICINE | Facility: CLINIC | Age: 54
End: 2024-03-29
Payer: COMMERCIAL

## 2024-03-29 DIAGNOSIS — E78.00 ELEVATED LDL CHOLESTEROL LEVEL: ICD-10-CM

## 2024-04-01 RX ORDER — ATORVASTATIN CALCIUM 10 MG/1
10 TABLET, FILM COATED ORAL DAILY
Qty: 90 TABLET | Refills: 0 | Status: SHIPPED | OUTPATIENT
Start: 2024-04-01 | End: 2025-04-01

## 2024-04-01 NOTE — TELEPHONE ENCOUNTER
No care due was identified.  Health [facility] Embedded Care Due Messages. Reference number: [id number].   4/01/2024 7:39:34 AM CDT

## 2024-05-08 ENCOUNTER — HOSPITAL ENCOUNTER (OUTPATIENT)
Dept: RADIOLOGY | Facility: HOSPITAL | Age: 54
Discharge: HOME | End: 2024-05-08
Payer: COMMERCIAL

## 2024-05-08 ENCOUNTER — OFFICE VISIT (OUTPATIENT)
Dept: FAMILY MEDICINE | Facility: CLINIC | Age: 54
End: 2024-05-08
Payer: COMMERCIAL

## 2024-05-08 VITALS
OXYGEN SATURATION: 98 % | DIASTOLIC BLOOD PRESSURE: 84 MMHG | HEIGHT: 68 IN | HEART RATE: 72 BPM | SYSTOLIC BLOOD PRESSURE: 136 MMHG | RESPIRATION RATE: 18 BRPM | WEIGHT: 198 LBS | TEMPERATURE: 98 F | BODY MASS INDEX: 30.01 KG/M2

## 2024-05-08 DIAGNOSIS — Z23 NEED FOR SHINGLES VACCINE: ICD-10-CM

## 2024-05-08 DIAGNOSIS — Z23 NEED FOR TDAP VACCINATION: ICD-10-CM

## 2024-05-08 DIAGNOSIS — M79.672 FOOT PAIN, LEFT: ICD-10-CM

## 2024-05-08 DIAGNOSIS — R68.84 PAIN IN LOWER JAW: Primary | ICD-10-CM

## 2024-05-08 DIAGNOSIS — R68.84 PAIN IN LOWER JAW: ICD-10-CM

## 2024-05-08 PROCEDURE — 70110 X-RAY EXAM OF JAW 4/> VIEWS: CPT | Mod: 26,,, | Performed by: RADIOLOGY

## 2024-05-08 PROCEDURE — 99999 PR PBB SHADOW E&M-EST. PATIENT-LVL V: CPT | Mod: PBBFAC,,,

## 2024-05-08 PROCEDURE — 70110 X-RAY EXAM OF JAW 4/> VIEWS: CPT | Mod: TC

## 2024-05-08 PROCEDURE — 1160F RVW MEDS BY RX/DR IN RCRD: CPT | Mod: CPTII,S$GLB,,

## 2024-05-08 PROCEDURE — 3079F DIAST BP 80-89 MM HG: CPT | Mod: CPTII,S$GLB,,

## 2024-05-08 PROCEDURE — 90471 IMMUNIZATION ADMIN: CPT | Mod: S$GLB,,,

## 2024-05-08 PROCEDURE — 90715 TDAP VACCINE 7 YRS/> IM: CPT | Mod: S$GLB,,,

## 2024-05-08 PROCEDURE — 3075F SYST BP GE 130 - 139MM HG: CPT | Mod: CPTII,S$GLB,,

## 2024-05-08 PROCEDURE — 90472 IMMUNIZATION ADMIN EACH ADD: CPT | Mod: S$GLB,,,

## 2024-05-08 PROCEDURE — 99214 OFFICE O/P EST MOD 30 MIN: CPT | Mod: 25,S$GLB,,

## 2024-05-08 PROCEDURE — 3044F HG A1C LEVEL LT 7.0%: CPT | Mod: CPTII,S$GLB,,

## 2024-05-08 PROCEDURE — 1159F MED LIST DOCD IN RCRD: CPT | Mod: CPTII,S$GLB,,

## 2024-05-08 PROCEDURE — 3008F BODY MASS INDEX DOCD: CPT | Mod: CPTII,S$GLB,,

## 2024-05-08 PROCEDURE — 90750 HZV VACC RECOMBINANT IM: CPT | Mod: S$GLB,,,

## 2024-05-08 RX ORDER — ACETAMINOPHEN 500 MG
5000 TABLET ORAL DAILY
COMMUNITY

## 2024-05-08 RX ORDER — IBUPROFEN 200 MG
200 TABLET ORAL EVERY 6 HOURS PRN
COMMUNITY

## 2024-05-08 RX ORDER — PROGESTERONE 200 MG/1
200 CAPSULE ORAL DAILY
COMMUNITY
End: 2024-06-13 | Stop reason: SDUPTHER

## 2024-05-08 RX ORDER — ESTRADIOL 2 MG/1
2 TABLET ORAL DAILY
COMMUNITY
End: 2024-06-13 | Stop reason: SDUPTHER

## 2024-05-08 RX ORDER — DULOXETIN HYDROCHLORIDE 60 MG/1
CAPSULE, DELAYED RELEASE ORAL
COMMUNITY
Start: 2024-04-15

## 2024-05-08 RX ORDER — NAPROXEN 500 MG/1
500 TABLET ORAL 2 TIMES DAILY
Qty: 30 TABLET | Refills: 0 | Status: SHIPPED | OUTPATIENT
Start: 2024-05-08

## 2024-05-08 NOTE — PROGRESS NOTES
HPI     Chief Complaint:  Chief Complaint   Patient presents with    Jaw Pain       Herlinda Ge is a 54 y.o. female with multiple medical diagnoses as listed in the medical history and problem list that presents for jaw pain.     Facial Injury   Incident onset: March 22, 2024 onset, assoc weakness and left shoulder sharp pain with breathing but both assoc symptoms stopped. Completely gone the next day. Jaw pain continued since then. Injury mechanism: none. There was no loss of consciousness. The quality of the pain is described as sharp. The pain is at a severity of 5/10. The pain has been constant since the injury. Pertinent negatives include no blurred vision, disorientation, headaches, memory loss, numbness, vomiting or weakness. Treatments tried: Ibuprofen 200mg 2x a day since 3/22/24 daily. The treatment provided no relief.   Denies chest pain, worse headache of her life, grinding her teeth or chewing on hard foods.    States Naproxen 500mg has helped with pain in past-foot pain, left.               Assessment & Plan   Patient has pain relief with Naproxen in the past.  Currently using one ibuprofen 200 mg twice a day.  Patient advised to discontinue ibuprofen and to start naproxen.  X-rays of jaw and referred to Dentistry.      Problem List Items Addressed This Visit          ENT    Pain in lower jaw - Primary    Relevant Medications    naproxen (NAPROSYN) 500 MG tablet    Other Relevant Orders    X-Ray Mandible More Than 4 Views    Ambulatory referral/consult to Dentistry ([place])       ID    Need for Tdap vaccination    Relevant Medications    Tdap (BOOSTRIX) vaccine injection 0.5 mL (Completed)    Need for shingles vaccine    Relevant Medications    varicella-zoster [id number] (PF)(SHINGRIX) 50 mcg/0.5 mL KIT (Completed)       Orthopedic    Foot pain, left   Naproxen will help during this time to treat both left jaw and foot pain      --------------------------------------------      Health  Maintenance:  Health Maintenance         Date Due Completion Date    Cervical Cancer Screening 06/05/2022 6/5/2019    COVID-19 Vaccine (10 - 2023-24 season) 09/01/2023 10/20/2022    Mammogram 01/08/2025 1/8/2024    High Dose Statin 04/01/2025 4/1/2024    Colorectal Cancer Screening 03/24/2026 3/24/2023    Hemoglobin A1c (Diabetic Prevention Screening) 01/04/2027 1/4/2024    Lipid Panel 01/04/2029 1/4/2024    TETANUS VACCINE 05/08/2034 5/8/2024            Health maintenance reviewed, Shingles vaccine ordered, and Tetanus vaccine ordered    Follow Up:  No follow-ups on file.    Exam     Review of Systems:  (as noted above)  Review of Systems   Constitutional:  Negative for fever.   HENT:  Negative for congestion, dental problem and trouble swallowing.         Left jaw pain, clicking and popping   Eyes:  Negative for blurred vision.   Respiratory:  Negative for chest tightness and shortness of breath.    Cardiovascular:  Negative for chest pain.   Gastrointestinal:  Negative for vomiting.   Musculoskeletal:  Negative for neck pain and neck stiffness.   Skin:  Negative for color change, pallor, rash and wound.   Neurological:  Negative for dizziness, weakness, numbness and headaches.   Psychiatric/Behavioral:  Negative for memory loss.        Physical Exam:   Physical Exam  Constitutional:       General: She is not in acute distress.     Appearance: Normal appearance. She is not ill-appearing, toxic-appearing or diaphoretic.   HENT:      Head: Normocephalic and atraumatic.      Right Ear: Tympanic membrane normal.      Left Ear: Tympanic membrane normal.      Nose: No congestion or rhinorrhea.   Eyes:      Extraocular Movements: Extraocular movements intact.   Neck:      Vascular: No carotid bruit.   Cardiovascular:      Rate and Rhythm: Normal rate and regular rhythm.      Pulses: Normal pulses.      Heart sounds: Normal heart sounds. No murmur heard.     No gallop.   Pulmonary:      Effort: Pulmonary effort is normal.  "No respiratory distress.      Breath sounds: Normal breath sounds. No wheezing.   Abdominal:      Palpations: Abdomen is soft.   Musculoskeletal:      Cervical back: Normal range of motion and neck supple. No rigidity or tenderness.      Right lower leg: No edema.      Left lower leg: No edema.   Lymphadenopathy:      Cervical: No cervical adenopathy.   Skin:     Findings: No bruising, erythema or lesion.      Comments: To left side of face   Neurological:      Mental Status: She is alert and oriented to person, place, and time.   Psychiatric:         Mood and Affect: Mood normal.       Vitals:    24 1258   BP: 136/84   BP Location: Right arm   Patient Position: Sitting   BP Method: Small (Manual)   Pulse: 72   Resp: 18   Temp: 98 °F (36.7 °C)   TempSrc: Oral   SpO2: 98%   Weight: 89.8 kg (197 lb 15.6 oz)   Height: 5' 8" (1.727 m)      Body mass index is 30.1 kg/m².        History     Past Medical History:  Past Medical History:   Diagnosis Date    ADHD     Fracture, vertebral, lumbar closed     History of anxiety     History of depression     History of eating disorder     Hormone replacement therapy (HRT)     Menopause 2016       Past Surgical History:  Past Surgical History:   Procedure Laterality Date     SECTION      Boy    COLONOSCOPY N/A 3/24/2023    Procedure: COLONOSCOPY;  Surgeon: Rk Roblero MD;  Location: [address];  Service: Endoscopy;  Laterality: N/A;  inst emailed-RB  pre call complete-as  3/22 patient r/s; instructions emailed to [email or web address]    KNEE ARTHROSCOPY Left     TENDON REPAIR Left     ankle     TONSILLECTOMY         Social History:  Social History     Socioeconomic History    Marital status: Single   Tobacco Use    Smoking status: Never    Smokeless tobacco: Never   Substance and Sexual Activity    Alcohol use: Yes     Alcohol/week: 0.0 standard drinks of alcohol     Comment: rare    Drug use: No    Sexual activity: Yes     Partners: Male     " Birth control/protection: Post-menopausal     Comment: :      2016     Social Determinants of Health     Financial Resource Strain: High Risk (12/28/2023)    Overall Financial Resource Strain (CARDIA)     Difficulty of Paying Living Expenses: Very hard   Food Insecurity: Food Insecurity Present (12/28/2023)    Hunger Vital Sign     Worried About Running Out of Food in the Last Year: Often true     Ran Out of Food in the Last Year: Sometimes true   Transportation Needs: No Transportation Needs (12/28/2023)    PRAPARE - Transportation     Lack of Transportation (Medical): No     Lack of Transportation (Non-Medical): No   Physical Activity: Insufficiently Active (12/28/2023)    Exercise Vital Sign     Days of Exercise per Week: 4 days     Minutes of Exercise per Session: 30 min   Stress: Stress Concern Present (12/28/2023)    [employer] - Occupational Stress Questionnaire     Feeling of Stress : To some extent   Housing Stability: Unknown (12/28/2023)    Housing Stability Vital Sign     Unable to Pay for Housing in the Last Year: Patient declined     Number of Places Lived in the Last Year: 1     Unstable Housing in the Last Year: No       Family History:  Family History   Problem Relation Name Age of Onset    Heart attack Father      Breast cancer Mother  71    Colon cancer Neg Hx      Ovarian cancer Neg Hx      Diabetes Neg Hx      Hypertension Neg Hx      Stroke Neg Hx         Allergies and Medications: (updated and reviewed)  Review of patient's allergies indicates:   Allergen Reactions    Sulfa (sulfonamide antibiotics) Itching and Swelling     Current Outpatient Medications   Medication Sig Dispense Refill    atorvastatin (LIPITOR) 10 MG tablet Take 1 tablet (10 mg total) by mouth once daily. 90 tablet 0    biotin 10,000 mcg Chew Take by mouth.      cholecalciferol, vitamin D3, (VITAMIN D3) 125 mcg (5,000 unit) Tab Take 5,000 Units by mouth once daily.       dextroamphetamine-amphetamine 10 mg Tab Take 1 tablet by mouth 2 (two) times daily.      DULoxetine (CYMBALTA) 60 MG capsule Take by mouth.      estradioL (ESTRACE) 2 MG tablet Take 2 mg by mouth once daily.      ibuprofen (ADVIL,MOTRIN) 200 MG tablet Take 200 mg by mouth every 6 (six) hours as needed for Pain.      LYSINE ORAL Take by mouth.      MAGNESIUM GLYCINATE ORAL Take 500 mg by mouth.      magnesium hydroxide (MAGNESIA ORAL) Take by mouth.      magnesium mal, threon, chelate 200 mg magnesium/scoop Powd Take by mouth.      microfibrillar collagen powder Apply 1 g topically as needed.      PRENATAL 26-IRON PS-FOLIC-DHA ORAL Take by mouth.      progesterone (PROMETRIUM) 200 MG capsule Take 200 mg by mouth once daily.      naproxen (NAPROSYN) 500 MG tablet Take 1 tablet (500 mg total) by mouth 2 (two) times daily. 30 tablet 0     No current facility-administered medications for this visit.       Patient Care Team:  Ashleigh Chambers MD as PCP - General (Family Medicine)  Leora Yates MA as Care Coordinator         - The patient is given an After Visit Summary that lists all medications with directions, allergies, education, orders placed during this encounter and follow-up instructions.      - I have reviewed the patient's medical information including past medical, family, and social history sections including the medications and allergies.      - We discussed the patient's current medications.     This note was created by combination of typed  and MModal dictation.  Transcription errors may be present.  If there are any questions, please contact me.       Oksana Albarado PA-C

## 2024-05-08 NOTE — PROGRESS NOTES
Health Maintenance Due   Topic     TETANUS VACCINE  Pt agree to get today    Cervical Cancer Screening  Pt has YADIN    Shingles Vaccine (2 of 2) Pt agree to get today    COVID-19 Vaccine (10 - 2023-24 season) Not offered at this Facility

## 2024-05-09 ENCOUNTER — PATIENT MESSAGE (OUTPATIENT)
Dept: FAMILY MEDICINE | Facility: CLINIC | Age: 54
End: 2024-05-09
Payer: COMMERCIAL

## 2024-06-13 ENCOUNTER — LAB VISIT (OUTPATIENT)
Dept: LAB | Facility: OTHER | Age: 54
End: 2024-06-13
Attending: OBSTETRICS & GYNECOLOGY
Payer: COMMERCIAL

## 2024-06-13 ENCOUNTER — OFFICE VISIT (OUTPATIENT)
Dept: OBSTETRICS AND GYNECOLOGY | Facility: CLINIC | Age: 54
End: 2024-06-13
Payer: COMMERCIAL

## 2024-06-13 VITALS
BODY MASS INDEX: 29.86 KG/M2 | SYSTOLIC BLOOD PRESSURE: 132 MMHG | WEIGHT: 197.06 LBS | HEIGHT: 68 IN | DIASTOLIC BLOOD PRESSURE: 84 MMHG

## 2024-06-13 DIAGNOSIS — Z78.0 MENOPAUSE: Primary | ICD-10-CM

## 2024-06-13 DIAGNOSIS — R63.5 WEIGHT GAIN: ICD-10-CM

## 2024-06-13 DIAGNOSIS — Z78.0 MENOPAUSE: ICD-10-CM

## 2024-06-13 DIAGNOSIS — E78.5 HYPERLIPIDEMIA, UNSPECIFIED HYPERLIPIDEMIA TYPE: ICD-10-CM

## 2024-06-13 LAB — ESTRADIOL SERPL-MCNC: 59 PG/ML

## 2024-06-13 PROCEDURE — 36415 COLL VENOUS BLD VENIPUNCTURE: CPT | Performed by: OBSTETRICS & GYNECOLOGY

## 2024-06-13 PROCEDURE — 3079F DIAST BP 80-89 MM HG: CPT | Mod: CPTII,S$GLB,, | Performed by: OBSTETRICS & GYNECOLOGY

## 2024-06-13 PROCEDURE — 3044F HG A1C LEVEL LT 7.0%: CPT | Mod: CPTII,S$GLB,, | Performed by: OBSTETRICS & GYNECOLOGY

## 2024-06-13 PROCEDURE — 3008F BODY MASS INDEX DOCD: CPT | Mod: CPTII,S$GLB,, | Performed by: OBSTETRICS & GYNECOLOGY

## 2024-06-13 PROCEDURE — 1159F MED LIST DOCD IN RCRD: CPT | Mod: CPTII,S$GLB,, | Performed by: OBSTETRICS & GYNECOLOGY

## 2024-06-13 PROCEDURE — 99999 PR PBB SHADOW E&M-EST. PATIENT-LVL IV: CPT | Mod: PBBFAC,,, | Performed by: OBSTETRICS & GYNECOLOGY

## 2024-06-13 PROCEDURE — 82670 ASSAY OF TOTAL ESTRADIOL: CPT | Performed by: OBSTETRICS & GYNECOLOGY

## 2024-06-13 PROCEDURE — 3075F SYST BP GE 130 - 139MM HG: CPT | Mod: CPTII,S$GLB,, | Performed by: OBSTETRICS & GYNECOLOGY

## 2024-06-13 PROCEDURE — 84402 ASSAY OF FREE TESTOSTERONE: CPT | Performed by: OBSTETRICS & GYNECOLOGY

## 2024-06-13 PROCEDURE — 99214 OFFICE O/P EST MOD 30 MIN: CPT | Mod: S$GLB,,, | Performed by: OBSTETRICS & GYNECOLOGY

## 2024-06-13 RX ORDER — PROGESTERONE 200 MG/1
200 CAPSULE ORAL DAILY
Qty: 90 CAPSULE | Refills: 4 | Status: SHIPPED | OUTPATIENT
Start: 2024-06-13

## 2024-06-13 RX ORDER — ESTRADIOL 2 MG/1
2 TABLET ORAL DAILY
Qty: 90 TABLET | Refills: 4 | Status: SHIPPED | OUTPATIENT
Start: 2024-06-13

## 2024-06-13 NOTE — PROGRESS NOTES
History & Physical  Gynecology      SUBJECTIVE:     Chief Complaint: Gynecologic Exam       History of Present Illness:  Pt is a 55 y/o who presents to discuss her HRT.  Pt stopped having menses at age 46.  Started on HRT at that time. Pt has been taking estradiol 2mg QD and prometrium 200mg QD.  Also using testosterone 2% cream every night.  Pt stopped taking for 2 years.  Started having menopausal such hot flashes, night sweats, insomnia, constipation, and skin changes.  Increased abdominal weight gain.  Pt having some mood changes, working through her anxiety and depression.  Pt's hx complicated by HLD.  This is a more recent finding.  Pt reports only elevated after off of HRT.  On lipitor now.  Last checked in 2024.      Review of patient's allergies indicates:   Allergen Reactions    Sulfa (sulfonamide antibiotics) Itching and Swelling       Past Medical History:   Diagnosis Date    ADHD     Fracture, vertebral, lumbar closed     History of anxiety     History of depression     History of eating disorder     Hormone replacement therapy (HRT)     Menopause 2016     Past Surgical History:   Procedure Laterality Date     SECTION      Boy    COLONOSCOPY N/A 3/24/2023    Procedure: COLONOSCOPY;  Surgeon: Rk Roblero MD;  Location: [facility] ([address] [place] [address]);  Service: Endoscopy;  Laterality: N/A;  inst emailed-RB  pre call complete-as  3/22 patient r/s; instructions emailed to [email or web address]    KNEE ARTHROSCOPY Left     TENDON REPAIR Left     ankle     TONSILLECTOMY       OB History          2    Para   1    Term   1            AB   1    Living   1         SAB        IAB        Ectopic        Multiple        Live Births   1           Obstetric Comments   menarche 10             Family History   Problem Relation Name Age of Onset    Heart attack Father      Breast cancer Mother  71    Colon cancer Neg Hx      Ovarian cancer Neg Hx      Diabetes Neg Hx      Hypertension Neg Hx       Stroke Neg Hx       Social History     Tobacco Use    Smoking status: Never    Smokeless tobacco: Never   Substance Use Topics    Alcohol use: Yes     Alcohol/week: 0.0 standard drinks of alcohol     Comment: rare    Drug use: No       Current Outpatient Medications   Medication Sig    atorvastatin (LIPITOR) 10 MG tablet Take 1 tablet (10 mg total) by mouth once daily.    biotin 10,000 mcg Chew Take by mouth.    cholecalciferol, vitamin D3, (VITAMIN D3) 125 mcg (5,000 unit) Tab Take 5,000 Units by mouth once daily.    dextroamphetamine-amphetamine 10 mg Tab Take 1 tablet by mouth 2 (two) times daily.    DULoxetine (CYMBALTA) 60 MG capsule Take by mouth.    estradioL (ESTRACE) 2 MG tablet Take 1 tablet (2 mg total) by mouth once daily.    ibuprofen (ADVIL,MOTRIN) 200 MG tablet Take 200 mg by mouth every 6 (six) hours as needed for Pain.    LYSINE ORAL Take by mouth.    MAGNESIUM GLYCINATE ORAL Take 500 mg by mouth.    magnesium hydroxide (MAGNESIA ORAL) Take by mouth.    magnesium mal, threon, chelate 200 mg magnesium/scoop Powd Take by mouth.    microfibrillar collagen powder Apply 1 g topically as needed.    naproxen (NAPROSYN) 500 MG tablet Take 1 tablet (500 mg total) by mouth 2 (two) times daily.    PRENATAL 26-IRON PS-FOLIC-DHA ORAL Take by mouth.    progesterone (PROMETRIUM) 200 MG capsule Take 1 capsule (200 mg total) by mouth once daily.     No current facility-administered medications for this visit.         Review of Systems:  Review of Systems   Constitutional:  Negative for activity change, appetite change, chills, fatigue, fever and unexpected weight change.   Respiratory:  Negative for cough, shortness of breath and wheezing.    Cardiovascular:  Negative for chest pain and leg swelling.   Gastrointestinal:  Negative for abdominal pain, constipation, diarrhea, nausea and vomiting.   Endocrine: Negative for hair loss and hot flashes.   Genitourinary:  Positive for hot flashes. Negative for decreased  libido, dyspareunia, dysuria, frequency, menstrual problem, pelvic pain, vaginal bleeding, vaginal discharge and vaginal pain.   Integumentary:  Negative for acne, hair changes, nipple discharge and breast skin changes.   Neurological:  Negative for headaches.   Psychiatric/Behavioral:  Positive for depression and sleep disturbance. The patient is nervous/anxious.    Breast: Negative for mastodynia, nipple discharge and skin changes       OBJECTIVE:     Physical Exam:  Physical Exam  Constitutional:       Appearance: She is well-developed.   HENT:      Head: Normocephalic and atraumatic.   Eyes:      General: No scleral icterus.        Right eye: No discharge.         Left eye: No discharge.      Conjunctiva/sclera: Conjunctivae normal.   Pulmonary:      Effort: Pulmonary effort is normal.      Breath sounds: No stridor.   Abdominal:      General: There is no distension.      Palpations: Abdomen is soft.      Tenderness: There is no abdominal tenderness.   Musculoskeletal:         General: Normal range of motion.   Skin:     General: Skin is warm and dry.   Neurological:      Mental Status: She is alert and oriented to person, place, and time.   Psychiatric:         Behavior: Behavior normal.         Thought Content: Thought content normal.         Judgment: Judgment normal.           ASSESSMENT:       ICD-10-CM ICD-9-CM    1. Menopause  Z78.0 627.2 Estradiol      Testosterone, Free      estradioL (ESTRACE) 2 MG tablet      progesterone (PROMETRIUM) 200 MG capsule      2. Weight gain  R63.5 783.1 Ambulatory referral/consult to Obstetrics / Gynecology      3. Hyperlipidemia, unspecified hyperlipidemia type  E78.5 272.4 Lipid Panel             Plan:      Herlinda was seen today for gynecologic exam.    Diagnoses and all orders for this visit:    Menopause  - Discussed getting hormone levels to see where she is on her current medications  - Will restart HRT.  Will start on previous regimen and change as needed.  - Will  check levels.  -     Estradiol; Future  -     Testosterone, Free; Future  -     estradioL (ESTRACE) 2 MG tablet; Take 1 tablet (2 mg total) by mouth once daily.  -     progesterone (PROMETRIUM) 200 MG capsule; Take 1 capsule (200 mg total) by mouth once daily.    Weight gain  -     Ambulatory referral/consult to Obstetrics / Gynecology    Hyperlipidemia, unspecified hyperlipidemia type  - Will recheck lipids to assess cholesterol   - Discussed HLD and HRT.  Discussed pros and cons.  All questions answered.  -     Lipid Panel; Future        Orders Placed This Encounter   Procedures    Estradiol    Testosterone, Free    Lipid Panel       No follow-ups on file.     Face to Face time with patient: 20 min    25 minutes of total time spent on the encounter, which includes face to face time and non-face to face time preparing to see the patient (eg, review of tests), Obtaining and/or reviewing separately obtained history, Documenting clinical information in the electronic or other health record, Independently interpreting results (not separately reported) and communicating results to the patient/family/caregiver, or Care coordination (not separately reported).      Cristine Peña

## 2024-06-17 LAB — TESTOST FREE SERPL-MCNC: 0.8 PG/ML

## 2024-06-20 ENCOUNTER — PATIENT MESSAGE (OUTPATIENT)
Dept: OBSTETRICS AND GYNECOLOGY | Facility: CLINIC | Age: 54
End: 2024-06-20
Payer: COMMERCIAL

## 2024-07-04 DIAGNOSIS — E78.00 ELEVATED LDL CHOLESTEROL LEVEL: ICD-10-CM

## 2024-07-04 NOTE — TELEPHONE ENCOUNTER
No care due was identified.  Health [facility] Embedded Care Due Messages. Reference number: [id number].   7/04/2024 5:42:27 AM CDT

## 2024-07-05 RX ORDER — ATORVASTATIN CALCIUM 10 MG/1
10 TABLET, FILM COATED ORAL
Qty: 90 TABLET | Refills: 1 | Status: SHIPPED | OUTPATIENT
Start: 2024-07-05

## 2024-07-05 NOTE — TELEPHONE ENCOUNTER
Refill Decision Note   Herlinda Joo  is requesting a refill authorization.  Brief Assessment and Rationale for Refill:  Approve     Medication Therapy Plan:         Comments:     Note composed:11:47 AM 07/05/2024

## 2024-07-09 DIAGNOSIS — E78.00 ELEVATED LDL CHOLESTEROL LEVEL: ICD-10-CM

## 2024-07-10 RX ORDER — ATORVASTATIN CALCIUM 10 MG/1
10 TABLET, FILM COATED ORAL NIGHTLY
Qty: 90 TABLET | Refills: 1 | Status: SHIPPED | OUTPATIENT
Start: 2024-07-10

## 2024-07-10 NOTE — TELEPHONE ENCOUNTER
No care due was identified.  [facility] Embedded Care Due Messages. Reference number: [id number].   7/09/2024 7:57:29 PM CDT

## 2024-07-10 NOTE — TELEPHONE ENCOUNTER
Last Office Visit Info:   The patient's last visit with Ashleigh Chambers MD was on 1/4/2024.    The patient's last visit in current department was on 5/8/2024.        Last CBC Results:   Lab Results   Component Value Date    WBC 5.57 01/04/2024    HGB 15.0 01/04/2024    HCT 43.2 01/04/2024     01/04/2024       Last CMP Results  Lab Results   Component Value Date     01/04/2024    K 4.7 01/04/2024     01/04/2024    CO2 24 01/04/2024    BUN 22 (H) 01/04/2024    CREATININE 1.0 01/04/2024    CALCIUM 9.6 01/04/2024    ALBUMIN 4.0 01/04/2024    AST 16 01/04/2024    ALT 7 (L) 01/04/2024       Last Lipids  Lab Results   Component Value Date    CHOL 279 (H) 01/04/2024    TRIG 134 01/04/2024    HDL 52 01/04/2024    LDLCALC 200.2 (H) 01/04/2024       Last A1C  Lab Results   Component Value Date    HGBA1C 5.0 01/04/2024       Last TSH  Lab Results   Component Value Date    TSH 1.465 01/04/2024             Current Med Refills  Medication List with Changes/Refills   Current Medications    ATORVASTATIN (LIPITOR) 10 MG TABLET    TAKE 1 TABLET(10 MG) BY MOUTH DAILY       Start Date: 7/5/2024  End Date: --    BIOTIN 10,000 MCG CHEW    Take by mouth.       Start Date: --        End Date: --    CHOLECALCIFEROL, VITAMIN D3, (VITAMIN D3) 125 MCG (5,000 UNIT) TAB    Take 5,000 Units by mouth once daily.       Start Date: --        End Date: --    DEXTROAMPHETAMINE-AMPHETAMINE 10 MG TAB    Take 1 tablet by mouth 2 (two) times daily.       Start Date: 8/3/2022  End Date: --    DULOXETINE (CYMBALTA) 60 MG CAPSULE    Take by mouth.       Start Date: 4/15/2024 End Date: --    ESTRADIOL (ESTRACE) 2 MG TABLET    Take 1 tablet (2 mg total) by mouth once daily.       Start Date: 6/13/2024 End Date: --    IBUPROFEN (ADVIL,MOTRIN) 200 MG TABLET    Take 200 mg by mouth every 6 (six) hours as needed for Pain.       Start Date: --        End Date: --    LYSINE ORAL    Take by mouth.       Start Date: --        End Date: --     MAGNESIUM GLYCINATE ORAL    Take 500 mg by mouth.       Start Date: --        End Date: --    MAGNESIUM HYDROXIDE (MAGNESIA ORAL)    Take by mouth.       Start Date: --        End Date: --    MAGNESIUM MAL, THREON, CHELATE 200 MG MAGNESIUM/SCOOP POWD    Take by mouth.       Start Date: --        End Date: --    MICROFIBRILLAR COLLAGEN POWDER    Apply 1 g topically as needed.       Start Date: --        End Date: --    NAPROXEN (NAPROSYN) 500 MG TABLET    Take 1 tablet (500 mg total) by mouth 2 (two) times daily.       Start Date: 5/8/2024  End Date: --    PRENATAL 26-IRON PS-FOLIC-DHA ORAL    Take by mouth.       Start Date: --        End Date: --    PROGESTERONE (PROMETRIUM) 200 MG CAPSULE    Take 1 capsule (200 mg total) by mouth once daily.       Start Date: 6/13/2024 End Date: --       Order(s) placed per written order guidelines: none    Please advise.

## 2024-07-22 ENCOUNTER — LAB VISIT (OUTPATIENT)
Dept: LAB | Facility: OTHER | Age: 54
End: 2024-07-22
Attending: OBSTETRICS & GYNECOLOGY
Payer: COMMERCIAL

## 2024-07-22 DIAGNOSIS — E78.5 HYPERLIPIDEMIA, UNSPECIFIED HYPERLIPIDEMIA TYPE: ICD-10-CM

## 2024-07-22 LAB
CHOLEST SERPL-MCNC: 198 MG/DL (ref 120–199)
CHOLEST/HDLC SERPL: 3.1 {RATIO} (ref 2–5)
HDLC SERPL-MCNC: 63 MG/DL (ref 40–75)
HDLC SERPL: 31.8 % (ref 20–50)
LDLC SERPL CALC-MCNC: 110.8 MG/DL (ref 63–159)
NONHDLC SERPL-MCNC: 135 MG/DL
TRIGL SERPL-MCNC: 121 MG/DL (ref 30–150)

## 2024-07-22 PROCEDURE — 36415 COLL VENOUS BLD VENIPUNCTURE: CPT | Performed by: OBSTETRICS & GYNECOLOGY

## 2024-07-22 PROCEDURE — 80061 LIPID PANEL: CPT | Performed by: OBSTETRICS & GYNECOLOGY

## 2024-08-30 ENCOUNTER — PATIENT MESSAGE (OUTPATIENT)
Dept: ADMINISTRATIVE | Facility: HOSPITAL | Age: 54
End: 2024-08-30
Payer: COMMERCIAL

## 2024-08-30 ENCOUNTER — PATIENT MESSAGE (OUTPATIENT)
Dept: FAMILY MEDICINE | Facility: CLINIC | Age: 54
End: 2024-08-30
Payer: COMMERCIAL

## 2024-08-30 ENCOUNTER — PATIENT OUTREACH (OUTPATIENT)
Dept: ADMINISTRATIVE | Facility: HOSPITAL | Age: 54
End: 2024-08-30
Payer: COMMERCIAL

## 2024-08-30 ENCOUNTER — E-VISIT (OUTPATIENT)
Dept: FAMILY MEDICINE | Facility: CLINIC | Age: 54
End: 2024-08-30
Payer: COMMERCIAL

## 2024-08-30 DIAGNOSIS — L23.7 POISON IVY DERMATITIS: Primary | ICD-10-CM

## 2024-08-30 NOTE — PROGRESS NOTES
Population Health Chart Review & Patient Outreach Details      Additional [facility] Health Notes:    Campaign message -- requesting to schedule PAP SMEAR. Awaiting for response from patient.            Updates Requested / Reviewed:      Updated Care Coordination Note and Care Everywhere         Health Maintenance Topics Overdue:      [facility] Score: 1     Cervical Cancer Screening                       Health Maintenance Topic(s) Outreach Outcomes & Actions Taken:    Cervical Cancer Screening - Outreach Outcomes & Actions Taken  : Campaign message -- requesting to schedule PAP SMEAR. Awaiting for response from patient.

## 2024-09-10 ENCOUNTER — PATIENT OUTREACH (OUTPATIENT)
Dept: ADMINISTRATIVE | Facility: HOSPITAL | Age: 54
End: 2024-09-10
Payer: COMMERCIAL

## 2024-09-10 NOTE — PROGRESS NOTES
Population Health Chart Review & Patient Outreach Details      Additional [facility] Notes:      DUE FOR PAP SMEAR -- schedule with Dr Peña first available as requested - -11/19/24 FOR 10:45 AM.          Updates Requested / Reviewed:      Updated Care Coordination Note and Care Everywhere         Health Maintenance Topics Overdue:      VB Score: 1     Cervical Cancer Screening                       Health Maintenance Topic(s) Outreach Outcomes & Actions Taken:    Cervical Cancer Screening - Outreach Outcomes & Actions Taken  : Pap Smear/HPV Scheduled in Primary Care or OBGYN

## 2024-10-15 ENCOUNTER — TELEPHONE (OUTPATIENT)
Dept: FAMILY MEDICINE | Facility: CLINIC | Age: 54
End: 2024-10-15
Payer: COMMERCIAL

## 2024-10-15 DIAGNOSIS — Z78.0 MENOPAUSE: ICD-10-CM

## 2024-10-15 RX ORDER — ESTRADIOL 2 MG/1
2 TABLET ORAL DAILY
Qty: 90 TABLET | Refills: 2 | Status: SHIPPED | OUTPATIENT
Start: 2024-10-15

## 2024-10-15 RX ORDER — PROGESTERONE 200 MG/1
200 CAPSULE ORAL DAILY
Qty: 90 CAPSULE | Refills: 2 | Status: SHIPPED | OUTPATIENT
Start: 2024-10-15

## 2024-10-15 NOTE — TELEPHONE ENCOUNTER
Patient advised of refill approval for 90 day supply with 1 additional refill sent to pharmacy on 7/10/2024.  Advised patient that she should have a refill available at the pharmacy.  Patient verbalized understanding.

## 2024-10-15 NOTE — TELEPHONE ENCOUNTER
----- Message from [employer] sent at 10/15/2024  1:05 PM CDT -----  Who Called: LES HUERTA [[id number]]              New Prescription or Refill : Refill        RX Name and Strength:   atorvastatin (LIPITOR) 10 MG tablet              30 day or 90 day RX: 30 day               Local or Mail Order : local               [id number] [address] [facility] [address]        Would the patient rather a call back or a response via [facility]? Call back         Best Call Back Number:    [phone]        Additional Information: Pt have one pill left

## 2024-10-16 NOTE — TELEPHONE ENCOUNTER
Refill Decision Note   Herlinda Joo  is requesting a refill authorization.  Brief Assessment and Rationale for Refill:  Approve     Medication Therapy Plan:         Comments:     Note composed:10:01 PM 10/15/2024

## 2024-11-19 ENCOUNTER — OFFICE VISIT (OUTPATIENT)
Dept: OBSTETRICS AND GYNECOLOGY | Facility: CLINIC | Age: 54
End: 2024-11-19
Payer: COMMERCIAL

## 2024-11-19 VITALS
WEIGHT: 196 LBS | SYSTOLIC BLOOD PRESSURE: 120 MMHG | DIASTOLIC BLOOD PRESSURE: 84 MMHG | BODY MASS INDEX: 29.7 KG/M2 | HEIGHT: 68 IN

## 2024-11-19 DIAGNOSIS — K64.9 HEMORRHOIDS, UNSPECIFIED HEMORRHOID TYPE: ICD-10-CM

## 2024-11-19 DIAGNOSIS — Z01.419 ENCOUNTER FOR WELL WOMAN EXAM WITH ROUTINE GYNECOLOGICAL EXAM: Primary | ICD-10-CM

## 2024-11-19 DIAGNOSIS — Z78.0 MENOPAUSE: ICD-10-CM

## 2024-11-19 PROCEDURE — 3074F SYST BP LT 130 MM HG: CPT | Mod: CPTII,S$GLB,, | Performed by: OBSTETRICS & GYNECOLOGY

## 2024-11-19 PROCEDURE — 3079F DIAST BP 80-89 MM HG: CPT | Mod: CPTII,S$GLB,, | Performed by: OBSTETRICS & GYNECOLOGY

## 2024-11-19 PROCEDURE — 3008F BODY MASS INDEX DOCD: CPT | Mod: CPTII,S$GLB,, | Performed by: OBSTETRICS & GYNECOLOGY

## 2024-11-19 PROCEDURE — 99396 PREV VISIT EST AGE 40-64: CPT | Mod: S$GLB,,, | Performed by: OBSTETRICS & GYNECOLOGY

## 2024-11-19 PROCEDURE — 87624 HPV HI-RISK TYP POOLED RSLT: CPT | Performed by: OBSTETRICS & GYNECOLOGY

## 2024-11-19 PROCEDURE — 99999 PR PBB SHADOW E&M-EST. PATIENT-LVL III: CPT | Mod: PBBFAC,,, | Performed by: OBSTETRICS & GYNECOLOGY

## 2024-11-19 PROCEDURE — 3044F HG A1C LEVEL LT 7.0%: CPT | Mod: CPTII,S$GLB,, | Performed by: OBSTETRICS & GYNECOLOGY

## 2024-11-19 RX ORDER — HYDROCORTISONE 25 MG/G
CREAM TOPICAL 3 TIMES DAILY
Qty: 28 G | Refills: 3 | Status: SHIPPED | OUTPATIENT
Start: 2024-11-19

## 2024-11-19 NOTE — PROGRESS NOTES
"History & Physical  Gynecology      SUBJECTIVE:     Chief Complaint: No chief complaint on file.       History of Present Illness:  Herlinda Ge is a 54 y.o. female   for annual routine Pap and checkup. No LMP recorded (lmp unknown). Patient is postmenopausal..  She has no unusual complaints.    Pt was recently started on sumaglutide (2.5mg) and doesn't not feel like it is helping.  Does not feel like she is losing weight.    Was told that testosterone was high so now not doing a full pump nightly.  Only doing part of a pump with each application.  On HRT and feels like she is doing well with this.  On estrace 2mg daily and prometrium daily.    No postmenopausal bleeding  denies break through bleeding.   denies vaginal itching or irritation.  denies vaginal discharge.    She is sexually active with 1 partner    History of abnormal pap: No  Last Pap: 2019, normal. HPV negative  Last MM  Last Colonoscopy:  , repeat in 3 years      Review of Systems:  Review of Systems   Constitutional: Negative.  Negative for chills and fatigue.   HENT:  Negative for facial swelling.    Eyes:  Negative for visual disturbance.   Respiratory:  Negative for cough and shortness of breath.    Cardiovascular:  Negative for chest pain.   Gastrointestinal:  Negative for abdominal distention, abdominal pain, constipation, diarrhea and nausea.   Genitourinary:  Negative for dysuria, genital sores, vaginal bleeding, vaginal discharge and vaginal pain.   Skin:  Negative for rash.   Neurological:  Negative for dizziness, weakness, light-headedness and headaches.   Psychiatric/Behavioral:  Negative for behavioral problems. The patient is not nervous/anxious.         OBJECTIVE:   Vitals:     Vitals:    24 1124   BP: 120/84   Weight: 88.9 kg (195 lb 15.8 oz)   Height: 5' 8" (1.727 m)        Physical Exam:  Physical Exam  Constitutional:       Appearance: She is well-developed.   HENT:      Head: " Normocephalic and atraumatic.   Eyes:      General: No scleral icterus.        Right eye: No discharge.         Left eye: No discharge.      Conjunctiva/sclera: Conjunctivae normal.   Pulmonary:      Effort: Pulmonary effort is normal.      Breath sounds: No stridor.   Chest:      Chest wall: No mass or tenderness.   Breasts:     Breasts are symmetrical.      Right: No inverted nipple, mass, nipple discharge, skin change or tenderness.      Left: No inverted nipple, mass, nipple discharge, skin change or tenderness.   Abdominal:      General: There is no distension.      Palpations: Abdomen is soft.      Tenderness: There is no abdominal tenderness.   Genitourinary:     Labia:         Right: No rash, tenderness, lesion or injury.         Left: No rash, tenderness, lesion or injury.       Vagina: Normal.      Cervix: No cervical motion tenderness, discharge or friability.      Adnexa:         Right: No mass, tenderness or fullness.          Left: No mass, tenderness or fullness.        Comments: Normal external genitalia.  Normal hair distribution.  Urethral meatus normal. No cervical lesions or masses.  No vaginal bleeding noted.  No adnexal or uterine tenderness.  No palpable adnexal masses.  Musculoskeletal:         General: Normal range of motion.   Skin:     General: Skin is warm and dry.   Neurological:      Mental Status: She is alert and oriented to person, place, and time.   Psychiatric:         Behavior: Behavior normal.         Thought Content: Thought content normal.         Judgment: Judgment normal.           ASSESSMENT:       ICD-10-CM ICD-9-CM    1. Encounter for well woman exam with routine gynecological exam  Z01.419 V72.31 Liquid-Based Pap Smear, Screening      HPV High Risk Genotypes, PCR      2. Menopause  Z78.0 627.2 Testosterone, free      3. BMI 29.0-29.9,adult  Z68.29 V85.25 Ambulatory referral/consult to [facility]      4. Hemorrhoids, unspecified hemorrhoid type   K64.9 455.6 hydrocortisone 2.5 % cream                 Plan:      Diagnoses and all orders for this visit:    Encounter for well woman exam with routine gynecological exam  -     Liquid-Based Pap Smear, Screening  -     HPV High Risk Genotypes, PCR  - Cotesting today  - MMG up to date  - Cscope up to date    Menopause  -     Testosterone, free; Future  - Was told testosterone is high.  Will check to assess level  - May help with muscle building to help with weight loss    BMI 29.0-29.9,adult  -     Ambulatory referral/consult to [facility]; Future  - Having trouble with weight loss.  WIll send to [facility] for consultation  - Discussed increasing weight lifting    Hemorrhoids, unspecified hemorrhoid type  -     hydrocortisone 2.5 % cream; Apply topically 3 (three) times daily.        Orders Placed This Encounter   Procedures    HPV High Risk Genotypes, PCR    Testosterone, free    Ambulatory referral/consult to [facility]       No follow-ups on file.      Face to Face time with patient: 15 min    20 minutes of total time spent on the encounter, which includes face to face time and non-face to face time preparing to see the patient (eg, review of tests), Obtaining and/or reviewing separately obtained history, Documenting clinical information in the electronic or other health record, Independently interpreting results (not separately reported) and communicating results to the patient/family/caregiver, or Care coordination (not separately reported).      Cristine Peña MD  Obstetrics & Gynecology

## 2024-11-23 LAB
FINAL PATHOLOGIC DIAGNOSIS: NORMAL
Lab: NORMAL

## 2025-04-10 DIAGNOSIS — K64.9 HEMORRHOIDS, UNSPECIFIED HEMORRHOID TYPE: ICD-10-CM

## 2025-04-11 RX ORDER — HYDROCORTISONE 25 MG/G
CREAM TOPICAL 3 TIMES DAILY
Qty: 28 G | Refills: 1 | Status: SHIPPED | OUTPATIENT
Start: 2025-04-11

## 2025-04-14 ENCOUNTER — PATIENT MESSAGE (OUTPATIENT)
Dept: OBSTETRICS AND GYNECOLOGY | Facility: CLINIC | Age: 55
End: 2025-04-14
Payer: COMMERCIAL

## 2025-06-24 DIAGNOSIS — Z78.0 MENOPAUSE: ICD-10-CM

## 2025-06-24 RX ORDER — ESTRADIOL 2 MG/1
TABLET ORAL
Qty: 90 TABLET | Refills: 0 | Status: SHIPPED | OUTPATIENT
Start: 2025-06-24

## 2025-06-24 NOTE — TELEPHONE ENCOUNTER
Refill Routing Note   Medication(s) are not appropriate for processing by [facility] for the following reason(s):        Required labs outdated    [facility] action(s):  Defer        Medication Therapy Plan: MMG OUTDATED      Appointments  past 12m or future 3m with PCP    Date Provider   Last Visit   11/19/2024 Cristine Peña MD   Next Visit   Visit date not found Cristine Peña MD   ED visits in past 90 days: 0        Note composed:10:42 AM 06/24/2025